# Patient Record
Sex: MALE | Race: WHITE | NOT HISPANIC OR LATINO | Employment: OTHER | URBAN - METROPOLITAN AREA
[De-identification: names, ages, dates, MRNs, and addresses within clinical notes are randomized per-mention and may not be internally consistent; named-entity substitution may affect disease eponyms.]

---

## 2021-03-16 ENCOUNTER — TRANSCRIBE ORDERS (OUTPATIENT)
Dept: LAB | Facility: HOSPITAL | Age: 66
End: 2021-03-16

## 2021-03-16 ENCOUNTER — TRANSCRIBE ORDERS (OUTPATIENT)
Dept: CARDIOLOGY | Facility: HOSPITAL | Age: 66
End: 2021-03-16

## 2021-03-16 ENCOUNTER — HOSPITAL ENCOUNTER (OUTPATIENT)
Dept: CARDIOLOGY | Facility: HOSPITAL | Age: 66
Discharge: HOME | End: 2021-03-16

## 2021-03-16 ENCOUNTER — LAB (OUTPATIENT)
Dept: LAB | Facility: HOSPITAL | Age: 66
End: 2021-03-16

## 2021-03-16 DIAGNOSIS — Z01.812 PRE-OPERATIVE LABORATORY EXAMINATION: Primary | ICD-10-CM

## 2021-03-16 DIAGNOSIS — Z01.812 PRE-OPERATIVE LABORATORY EXAMINATION: ICD-10-CM

## 2021-03-16 LAB
ALBUMIN SERPL-MCNC: 4.2 G/DL (ref 3.5–5.2)
ALBUMIN/GLOB SERPL: 1.6 G/DL
ALP SERPL-CCNC: 44 U/L (ref 39–117)
ALT SERPL W P-5'-P-CCNC: 45 U/L (ref 1–41)
ANION GAP SERPL CALCULATED.3IONS-SCNC: 8.4 MMOL/L (ref 5–15)
AST SERPL-CCNC: 31 U/L (ref 1–40)
BILIRUB SERPL-MCNC: 0.7 MG/DL (ref 0–1.2)
BUN SERPL-MCNC: 15 MG/DL (ref 8–23)
BUN/CREAT SERPL: 13.5 (ref 7–25)
CALCIUM SPEC-SCNC: 8.9 MG/DL (ref 8.6–10.5)
CHLORIDE SERPL-SCNC: 104 MMOL/L (ref 98–107)
CO2 SERPL-SCNC: 26.6 MMOL/L (ref 22–29)
CREAT SERPL-MCNC: 1.11 MG/DL (ref 0.76–1.27)
DEPRECATED RDW RBC AUTO: 48.2 FL (ref 37–54)
ERYTHROCYTE [DISTWIDTH] IN BLOOD BY AUTOMATED COUNT: 15.7 % (ref 12.3–15.4)
GFR SERPL CREATININE-BSD FRML MDRD: 66 ML/MIN/1.73
GLOBULIN UR ELPH-MCNC: 2.6 GM/DL
GLUCOSE SERPL-MCNC: 112 MG/DL (ref 65–99)
HCT VFR BLD AUTO: 40.2 % (ref 37.5–51)
HGB BLD-MCNC: 13.2 G/DL (ref 13–17.7)
MCH RBC QN AUTO: 27.7 PG (ref 26.6–33)
MCHC RBC AUTO-ENTMCNC: 32.8 G/DL (ref 31.5–35.7)
MCV RBC AUTO: 84.5 FL (ref 79–97)
PLATELET # BLD AUTO: 247 10*3/MM3 (ref 140–450)
PMV BLD AUTO: 10.9 FL (ref 6–12)
POTASSIUM SERPL-SCNC: 4.2 MMOL/L (ref 3.5–5.2)
PROT SERPL-MCNC: 6.8 G/DL (ref 6–8.5)
QT INTERVAL: 408 MS
QTC INTERVAL: 433 MS
RBC # BLD AUTO: 4.76 10*6/MM3 (ref 4.14–5.8)
SODIUM SERPL-SCNC: 139 MMOL/L (ref 136–145)
WBC # BLD AUTO: 5.16 10*3/MM3 (ref 3.4–10.8)

## 2021-03-16 PROCEDURE — 36415 COLL VENOUS BLD VENIPUNCTURE: CPT

## 2021-03-16 PROCEDURE — 85027 COMPLETE CBC AUTOMATED: CPT

## 2021-03-16 PROCEDURE — 93005 ELECTROCARDIOGRAM TRACING: CPT | Performed by: OTOLARYNGOLOGY

## 2021-03-16 PROCEDURE — 93010 ELECTROCARDIOGRAM REPORT: CPT | Performed by: INTERNAL MEDICINE

## 2021-03-16 PROCEDURE — 80053 COMPREHEN METABOLIC PANEL: CPT

## 2021-08-09 ENCOUNTER — OFFICE VISIT (OUTPATIENT)
Dept: FAMILY MEDICINE CLINIC | Facility: CLINIC | Age: 66
End: 2021-08-09

## 2021-08-09 VITALS
HEIGHT: 73 IN | TEMPERATURE: 98.7 F | OXYGEN SATURATION: 97 % | WEIGHT: 262.6 LBS | HEART RATE: 70 BPM | DIASTOLIC BLOOD PRESSURE: 80 MMHG | SYSTOLIC BLOOD PRESSURE: 115 MMHG | BODY MASS INDEX: 34.8 KG/M2

## 2021-08-09 DIAGNOSIS — Z12.11 SCREENING FOR COLORECTAL CANCER: ICD-10-CM

## 2021-08-09 DIAGNOSIS — N52.9 ERECTILE DYSFUNCTION, UNSPECIFIED ERECTILE DYSFUNCTION TYPE: Primary | ICD-10-CM

## 2021-08-09 DIAGNOSIS — Z12.12 SCREENING FOR COLORECTAL CANCER: ICD-10-CM

## 2021-08-09 DIAGNOSIS — N40.1 BENIGN PROSTATIC HYPERPLASIA WITH NOCTURIA: ICD-10-CM

## 2021-08-09 DIAGNOSIS — R35.1 BENIGN PROSTATIC HYPERPLASIA WITH NOCTURIA: ICD-10-CM

## 2021-08-09 DIAGNOSIS — Z00.00 ANNUAL PHYSICAL EXAM: ICD-10-CM

## 2021-08-09 DIAGNOSIS — Z12.5 PROSTATE CANCER SCREENING: ICD-10-CM

## 2021-08-09 LAB — HBA1C MFR BLD: 5.5 % (ref 4.8–5.6)

## 2021-08-09 PROCEDURE — 90471 IMMUNIZATION ADMIN: CPT | Performed by: FAMILY MEDICINE

## 2021-08-09 PROCEDURE — 90715 TDAP VACCINE 7 YRS/> IM: CPT | Performed by: FAMILY MEDICINE

## 2021-08-09 PROCEDURE — 84153 ASSAY OF PSA TOTAL: CPT | Performed by: FAMILY MEDICINE

## 2021-08-09 PROCEDURE — 90750 HZV VACC RECOMBINANT IM: CPT | Performed by: FAMILY MEDICINE

## 2021-08-09 PROCEDURE — 99387 INIT PM E/M NEW PAT 65+ YRS: CPT | Performed by: FAMILY MEDICINE

## 2021-08-09 PROCEDURE — 83036 HEMOGLOBIN GLYCOSYLATED A1C: CPT | Performed by: FAMILY MEDICINE

## 2021-08-09 PROCEDURE — 99204 OFFICE O/P NEW MOD 45 MIN: CPT | Performed by: FAMILY MEDICINE

## 2021-08-09 PROCEDURE — 80061 LIPID PANEL: CPT | Performed by: FAMILY MEDICINE

## 2021-08-09 PROCEDURE — 86803 HEPATITIS C AB TEST: CPT | Performed by: FAMILY MEDICINE

## 2021-08-09 PROCEDURE — 80053 COMPREHEN METABOLIC PANEL: CPT | Performed by: FAMILY MEDICINE

## 2021-08-09 PROCEDURE — 81001 URINALYSIS AUTO W/SCOPE: CPT | Performed by: FAMILY MEDICINE

## 2021-08-09 PROCEDURE — 90472 IMMUNIZATION ADMIN EACH ADD: CPT | Performed by: FAMILY MEDICINE

## 2021-08-09 PROCEDURE — G0432 EIA HIV-1/HIV-2 SCREEN: HCPCS | Performed by: FAMILY MEDICINE

## 2021-08-09 RX ORDER — FLUTICASONE PROPIONATE 50 MCG
2 SPRAY, SUSPENSION (ML) NASAL DAILY
COMMUNITY

## 2021-08-09 RX ORDER — TADALAFIL 5 MG/1
5 TABLET ORAL DAILY PRN
Qty: 30 TABLET | Refills: 0 | Status: SHIPPED | OUTPATIENT
Start: 2021-08-09 | End: 2021-11-09

## 2021-08-09 RX ORDER — DIPHENHYDRAMINE HCL 25 MG
25 CAPSULE ORAL EVERY 6 HOURS PRN
COMMUNITY
End: 2023-03-14

## 2021-08-09 RX ORDER — TAMSULOSIN HYDROCHLORIDE 0.4 MG/1
1 CAPSULE ORAL DAILY
Qty: 90 CAPSULE | Refills: 0 | Status: SHIPPED | OUTPATIENT
Start: 2021-08-09 | End: 2021-11-09 | Stop reason: SDUPTHER

## 2021-08-09 RX ORDER — CETIRIZINE HCL 10 MG/1
10 CAPSULE ORAL DAILY
COMMUNITY

## 2021-08-09 RX ORDER — MULTIPLE VITAMINS W/ MINERALS TAB 9MG-400MCG
1 TAB ORAL DAILY
COMMUNITY

## 2021-08-09 NOTE — PROGRESS NOTES
New Patient Office Visit      Patient Name: Angel Avelar  : 1955   MRN: [id number]     Chief Complaint:    Chief Complaint   Patient presents with   • Establish Care     annual       History of Present Illness: Angel Avelar is a 66 y.o. male who is here today to establish care. Patient presents for annual exam. Patient denies regular exercise. Patient eats well balanced diet but does eat some sweets.     BPH - patient states he has an enlarged prostate. Patient states he seen a urologist when he lived in [place]. Patient states he did not get much relief while seeing urologist and stopped. Patient states he has hesitancy, weak stream, dribbling and nocturia. Patient states he gets up at least 1x/nightly. Patient states he limits fluids after 8pm and tends to not get up more than once nightly. Patient denies ever trying tamsulosin for this problem. Patient has 2 brothers and father that had prostate cancer.    Erectile dysfunction - patient states this is caused from the BPH. Patient tried cialis 20 mg and states it did help some, patient states he felt this did not help as much after taking it for a while.  Prescription ran out in march and would like to have this filled today. Patient states he also tried a daily medication but it did not help as much as the cialis.    Hearing problems - patient had part of bone removed in right ear 3/2021. Patient follows ENT, Dr WONG, at [facility].     Review of systems positive for history of BPH, erectile dysfunction and hearing problems. Patient denies any chest pain, shortness of breath, changes with bowel movements or urination.     Physical exam: mood and affect appropriate. Heart and lung exam normal. Neuro exam grossly intact except for hearing.    Subjective          Past Medical History:   Past Medical History:   Diagnosis Date   • Allergic        Past Surgical History:   Past Surgical History:   Procedure Laterality Date   • INNER EAR SURGERY         Family  History:   Family History   Problem Relation Age of Onset   • Diabetes Mother    • Cancer Father    • Prostate cancer Father    • Prostate cancer Brother    • No Known Problems Son    • No Known Problems Maternal Grandmother    • No Known Problems Maternal Grandfather    • No Known Problems Paternal Grandmother    • No Known Problems Paternal Grandfather    • Prostate cancer Brother    • Diabetes Brother    • No Known Problems Son    • No Known Problems Son        Social History:   Social History     Socioeconomic History   • Marital status:      Spouse name: Not on file   • Number of children: Not on file   • Years of education: Not on file   • Highest education level: Not on file   Tobacco Use   • Smoking status: Never Smoker   • Smokeless tobacco: Never Used   Vaping Use   • Vaping Use: Never used   Substance and Sexual Activity   • Alcohol use: Yes     Alcohol/week: 1.0 standard drinks     Types: 1 Glasses of wine per week     Comment: every now and then    • Drug use: Never   • Sexual activity: Yes     Partners: Female     Birth control/protection: None       Medications:     Current Outpatient Medications:   •  Cetirizine HCl (Allergy Relief) 10 MG capsule, Take  by mouth., Disp: , Rfl:   •  diphenhydrAMINE (BENADRYL) 25 mg capsule, Take 25 mg by mouth Every 6 (Six) Hours As Needed for Itching., Disp: , Rfl:   •  fluticasone (FLONASE) 50 MCG/ACT nasal spray, 2 sprays into the nostril(s) as directed by provider Daily., Disp: , Rfl:   •  multivitamin with minerals (EQ COMPLETE MULTIVIT ADULT 50+ PO), Take 1 tablet by mouth Daily., Disp: , Rfl:   •  tadalafil (Cialis) 5 MG tablet, Take 1 tablet by mouth Daily As Needed for Erectile Dysfunction., Disp: 30 tablet, Rfl: 0  •  tamsulosin (FLOMAX) 0.4 MG capsule 24 hr capsule, Take 1 capsule by mouth Daily., Disp: 90 capsule, Rfl: 0  No current facility-administered medications for this visit.    Allergies:   No Known Allergies    Objective     Physical Exam:  "Please see [facility] [place] for physical exam  Vital Signs:   Vitals:    08/09/21 0908   BP: 115/80   Pulse: 70   Temp: 98.7 °F (37.1 °C)   SpO2: 97%   Weight: 119 kg (262 lb 9.6 oz)   Height: 185.4 cm (73\")   PainSc: 0-No pain     Body mass index is 34.65 kg/m².       Assessment / Plan      Assessment/Plan:   Diagnoses and all orders for this visit:    1. Erectile dysfunction, unspecified erectile dysfunction type (Primary)  -     tadalafil (Cialis) 5 MG tablet; Take 1 tablet by mouth Daily As Needed for Erectile Dysfunction.  Dispense: 30 tablet; Refill: 0    2. Benign prostatic hyperplasia with nocturia  -     tamsulosin (FLOMAX) 0.4 MG capsule 24 hr capsule; Take 1 capsule by mouth Daily.  Dispense: 90 capsule; Refill: 0    3. Screening for colorectal cancer    4. Prostate cancer screening  -     PSA DIAGNOSTIC ONLY    5. Annual physical exam  -     Hemoglobin A1c  -     Comprehensive Metabolic Panel  -     Urinalysis With Culture If Indicated - Urine, Clean Catch  -     Lipid Panel  -     Hepatitis C Antibody  -     HIV-1 / O / 2 Ag / Antibody 4th Generation  -     Tdap Vaccine Greater Than or Equal To 8yo IM  -     Shingrix Vaccine  -     pneumococcal polysaccharide 23-valent (PNEUMOVAX-23) vaccine 0.5 mL        1. Annual counseling: Counseled patient regards to the vaccine status and he agreed to tetanus, zoster and shingles vaccines.  Counseled patient regards to screening labs above hip reveals as well.  We discussed prostate cancer screening colorectal cancer screening.  Patient agreed to prostate cancer screening today.    The patient's BPH we will try Flomax.  In the future he may have to consult urology for further evaluation.  He reports abnormal shaped prostate in the past which is concerning for prostate cancer.      Follow Up:   Return in about 3 months (around 11/9/2021).    True Valladares,   [facility] [place]     I attest that I have reviewed the student note and that the components of the history " of the present illness, the physical exam, and the assessment and plan documented were performed by me or were performed in my presence by the student and verified by me.

## 2021-08-10 DIAGNOSIS — N18.31 STAGE 3A CHRONIC KIDNEY DISEASE (HCC): Primary | ICD-10-CM

## 2021-08-10 DIAGNOSIS — R97.20 ELEVATED PSA: ICD-10-CM

## 2021-08-10 LAB
ALBUMIN SERPL-MCNC: 4.5 G/DL (ref 3.5–5.2)
ALBUMIN/GLOB SERPL: 1.7 G/DL
ALP SERPL-CCNC: 43 U/L (ref 39–117)
ALT SERPL W P-5'-P-CCNC: 35 U/L (ref 1–41)
ANION GAP SERPL CALCULATED.3IONS-SCNC: 12.3 MMOL/L (ref 5–15)
AST SERPL-CCNC: 28 U/L (ref 1–40)
BACTERIA UR QL AUTO: ABNORMAL /HPF
BILIRUB SERPL-MCNC: 1.1 MG/DL (ref 0–1.2)
BILIRUB UR QL STRIP: NEGATIVE
BUN SERPL-MCNC: 17 MG/DL (ref 8–23)
BUN/CREAT SERPL: 11.1 (ref 7–25)
CALCIUM SPEC-SCNC: 9.5 MG/DL (ref 8.6–10.5)
CHLORIDE SERPL-SCNC: 108 MMOL/L (ref 98–107)
CHOLEST SERPL-MCNC: 158 MG/DL (ref 0–200)
CLARITY UR: CLEAR
CO2 SERPL-SCNC: 23.7 MMOL/L (ref 22–29)
COD CRY URNS QL: ABNORMAL /HPF
COLOR UR: ABNORMAL
CREAT SERPL-MCNC: 1.53 MG/DL (ref 0.76–1.27)
GFR SERPL CREATININE-BSD FRML MDRD: 46 ML/MIN/1.73
GLOBULIN UR ELPH-MCNC: 2.6 GM/DL
GLUCOSE SERPL-MCNC: 94 MG/DL (ref 65–99)
GLUCOSE UR STRIP-MCNC: NEGATIVE MG/DL
HCV AB SER DONR QL: NORMAL
HDLC SERPL-MCNC: 41 MG/DL (ref 40–60)
HGB UR QL STRIP.AUTO: NEGATIVE
HIV1+2 AB SER QL: NORMAL
HYALINE CASTS UR QL AUTO: ABNORMAL /LPF
KETONES UR QL STRIP: ABNORMAL
LDLC SERPL CALC-MCNC: 100 MG/DL (ref 0–100)
LDLC/HDLC SERPL: 2.42 {RATIO}
LEUKOCYTE ESTERASE UR QL STRIP.AUTO: NEGATIVE
MUCOUS THREADS URNS QL MICRO: ABNORMAL /HPF
NITRITE UR QL STRIP: NEGATIVE
PH UR STRIP.AUTO: 5.5 [PH] (ref 5–8)
POTASSIUM SERPL-SCNC: 4.4 MMOL/L (ref 3.5–5.2)
PROT SERPL-MCNC: 7.1 G/DL (ref 6–8.5)
PROT UR QL STRIP: ABNORMAL
PSA SERPL-MCNC: 4.62 NG/ML (ref 0–4)
RBC # UR: ABNORMAL /HPF
REF LAB TEST METHOD: ABNORMAL
SODIUM SERPL-SCNC: 144 MMOL/L (ref 136–145)
SP GR UR STRIP: 1.02 (ref 1–1.03)
SPERM URNS QL MICRO: ABNORMAL /HPF
SQUAMOUS #/AREA URNS HPF: ABNORMAL /HPF
TRIGL SERPL-MCNC: 88 MG/DL (ref 0–150)
UROBILINOGEN UR QL STRIP: ABNORMAL
VLDLC SERPL-MCNC: 17 MG/DL (ref 5–40)
WBC UR QL AUTO: ABNORMAL /HPF

## 2021-08-17 ENCOUNTER — OFFICE VISIT (OUTPATIENT)
Dept: UROLOGY | Facility: CLINIC | Age: 66
End: 2021-08-17

## 2021-08-17 VITALS
HEIGHT: 73 IN | SYSTOLIC BLOOD PRESSURE: 126 MMHG | DIASTOLIC BLOOD PRESSURE: 74 MMHG | OXYGEN SATURATION: 97 % | WEIGHT: 265 LBS | HEART RATE: 91 BPM | BODY MASS INDEX: 35.12 KG/M2

## 2021-08-17 DIAGNOSIS — R39.9 LOWER URINARY TRACT SYMPTOMS (LUTS): ICD-10-CM

## 2021-08-17 DIAGNOSIS — N52.9 ERECTILE DYSFUNCTION, UNSPECIFIED ERECTILE DYSFUNCTION TYPE: ICD-10-CM

## 2021-08-17 DIAGNOSIS — R97.20 ELEVATED PROSTATE SPECIFIC ANTIGEN (PSA): Primary | ICD-10-CM

## 2021-08-17 LAB
BILIRUB BLD-MCNC: NEGATIVE MG/DL
CLARITY, POC: CLEAR
COLOR UR: YELLOW
GLUCOSE UR STRIP-MCNC: NEGATIVE MG/DL
KETONES UR QL: NEGATIVE
LEUKOCYTE EST, POC: NEGATIVE
NITRITE UR-MCNC: NEGATIVE MG/ML
PH UR: 6 [PH] (ref 5–8)
PROT UR STRIP-MCNC: NEGATIVE MG/DL
RBC # UR STRIP: NEGATIVE /UL
SP GR UR: 1.01 (ref 1–1.03)
UROBILINOGEN UR QL: NORMAL

## 2021-08-17 PROCEDURE — 51798 US URINE CAPACITY MEASURE: CPT | Performed by: UROLOGY

## 2021-08-17 PROCEDURE — 99205 OFFICE O/P NEW HI 60 MIN: CPT | Performed by: UROLOGY

## 2021-08-17 PROCEDURE — 81003 URINALYSIS AUTO W/O SCOPE: CPT | Performed by: UROLOGY

## 2021-08-17 NOTE — PROGRESS NOTES
Elevated PSA Office Visit      Patient Name: Angel Avelar  : 1955   MRN: [id number]     Chief Complaint:  Elevated PSA.    Chief Complaint   Patient presents with   • Elevated PSA       Referring Provider: True Valladares DO    History of Present Illness: Angel Avelar is a 66 y.o. male who presents today with history of elevated PSA, lower urinary tract symptoms, ED.  Patient reports a multiple year history of lower urinary tract symptoms.  He reports that he was previously followed by urologist in [place].  He has recently moved an established residence in [place].  He reports that his symptoms include mild difficulty initiating stream, weakened urinary stream and subjective feeling of incomplete emptying.  Patient reports nocturia 1X.  He was recently seen by newly established PCP and was started on tamsulosin 0.4 mg daily.  He reports improvement in symptoms with starting p.o. medication.  Also reports multiple year history of difficulty with erections  He reports he is able to achieve erection but has difficulty in maintaining an erection through intercourse.  He has previously trialed both brand-name Cialis and generic tadalafil.  He reports this medication has helped with his symptoms but his prescription has recently been discontinued when he moved to [place].  He was recently seen by newly established PCP and was started on 5 mg tadalafil as needed.  At his annual exam a PSA was obtained and this was demonstrated to be 4.6.  The patient was referred to urology for evaluation of this PSA value.  Patient denies prior history of urinary tract infection.  Denies history of hematuria.  Denies prior urologic procedure or instrumentation.  Patient is a never smoker.  Does have a history of prostate cancer in both his father and brother.    Subjective      Review of System: Review of Systems   Constitutional: Negative.  Negative for chills, fatigue, fever and unexpected weight change.   HENT:  Negative.  Negative for sore throat.    Eyes: Negative.  Negative for visual disturbance.   Respiratory: Negative.  Negative for cough, chest tightness and shortness of breath.    Cardiovascular: Negative.  Negative for chest pain and leg swelling.   Gastrointestinal: Negative.  Negative for blood in stool, constipation, diarrhea, nausea, rectal pain and vomiting.   Genitourinary: Positive for difficulty urinating, frequency and urgency. Negative for decreased urine volume, dysuria, enuresis, flank pain, genital sores and hematuria.   Musculoskeletal: Negative.  Negative for back pain and joint swelling.   Skin: Negative.  Negative for rash and wound.   Neurological: Negative.  Negative for seizures, speech difficulty, weakness and headaches.   Psychiatric/Behavioral: Negative.  Negative for confusion, sleep disturbance and suicidal ideas. The patient is not nervous/anxious.       I have reviewed the ROS documented by my clinical staff, updated as appropriate and I agree. Jaime Harley MD    Past Medical History:   Past Medical History:   Diagnosis Date   • Allergic        Past Surgical History:   Past Surgical History:   Procedure Laterality Date   • INNER EAR SURGERY         Family History:   Family History   Problem Relation Age of Onset   • Diabetes Mother    • Cancer Father    • Prostate cancer Father    • Prostate cancer Brother    • No Known Problems Son    • No Known Problems Maternal Grandmother    • No Known Problems Maternal Grandfather    • No Known Problems Paternal Grandmother    • No Known Problems Paternal Grandfather    • Prostate cancer Brother    • Diabetes Brother    • No Known Problems Son    • No Known Problems Son        Social History:   Social History     Socioeconomic History   • Marital status:      Spouse name: Not on file   • Number of children: Not on file   • Years of education: Not on file   • Highest education level: Not on file   Tobacco Use   • Smoking status: Never Smoker    • Smokeless tobacco: Never Used   Vaping Use   • Vaping Use: Never used   Substance and Sexual Activity   • Alcohol use: Yes     Alcohol/week: 1.0 standard drinks     Types: 1 Glasses of wine per week     Comment: every now and then    • Drug use: Never   • Sexual activity: Yes     Partners: Female     Birth control/protection: None       Medications:     Current Outpatient Medications:   •  Cetirizine HCl (Allergy Relief) 10 MG capsule, Take  by mouth., Disp: , Rfl:   •  diphenhydrAMINE (BENADRYL) 25 mg capsule, Take 25 mg by mouth Every 6 (Six) Hours As Needed for Itching., Disp: , Rfl:   •  fluticasone (FLONASE) 50 MCG/ACT nasal spray, 2 sprays into the nostril(s) as directed by provider Daily., Disp: , Rfl:   •  multivitamin with minerals (EQ COMPLETE MULTIVIT ADULT 50+ PO), Take 1 tablet by mouth Daily., Disp: , Rfl:   •  tadalafil (Cialis) 5 MG tablet, Take 1 tablet by mouth Daily As Needed for Erectile Dysfunction., Disp: 30 tablet, Rfl: 0  •  tamsulosin (FLOMAX) 0.4 MG capsule 24 hr capsule, Take 1 capsule by mouth Daily., Disp: 90 capsule, Rfl: 0    Allergies:   No Known Allergies    IPSS Questionnaire (AUA-7):  Over the past month…    1)  Incomplete Emptying  How often have you had a sensation of not emptying your bladder?  3 - About half the time   2)  Frequency  How often have you had to urinate less than every two hours? 3 - About half the time   3)  Intermittency  How often have you found you stopped and started again several times when you urinated?  0 - Not at all   4) Urgency  How often have you found it difficult to postpone urination?  4 - More than half the time   5) Weak Stream  How often have you had a weak urinary stream?  5 - Almost always   6) Straining  How often have you had to push or strain to begin urination?  1 - Less than 1 time in 5   7) Nocturia  How many times did you typically get up at night to urinate?  1 - 1 time   Total Score:  17       Quality of life due to urinary  "symptoms:  If you were to spend the rest of your life with your urinary condition the way it is now, how would you feel about that? 3-Mixed   Urine Leakage (Incontinence) 1-Mild (A few drops a day, no pad use)     Sexual Health Inventory for Men (JENNIFER)   Over the past 6 months:     1. How do you rate your confidence that you could get and keep an erection?  2 - Low   2. When you had erections with sexual                                     stimulation, how often were your erections hard enough for penetration (entering your partner)?  2 - A few times (much less than half the time)   3. During sexual intercourse, how often were you able to maintain your erection after you had penetrated (entered) your partner?  1 - Almost never or never   4. During sexual intercourse, how difficult was it to maintain your erection to completion of intercourse?  2 - Very difficult    5. When you attempted sexual intercourse, how often was it satisfactory for you?  2 - A few times (much less than half the time)    Total Score: 9   The Sexual Health Inventory for Men further classifies ED severity with the following breakpoints:   1-7 (Severe ED) 8-11 (Moderate ED) 12-16 (Mild to Moderate ED) 17-21 (Mild ED)      Post void residual bladder scan:   00mL     Objective     Physical Exam:   Vital Signs:   Vitals:    08/17/21 1351   BP: 126/74   BP Location: Right arm   Patient Position: Sitting   Cuff Size: Adult   Pulse: 91   SpO2: 97%   Weight: 120 kg (265 lb)   Height: 185.4 cm (73\")   PainSc: 0-No pain     Body mass index is 34.96 kg/m².     Physical Exam  Vitals and nursing note reviewed.   Constitutional:       Appearance: Normal appearance. He is normal weight.   HENT:      Head: Normocephalic and atraumatic.   Eyes:      Extraocular Movements: Extraocular movements intact.   Cardiovascular:      Comments: Well-perfused  Pulmonary:      Effort: Pulmonary effort is normal.   Abdominal:      General: Abdomen is flat.      Palpations: " Abdomen is soft.   Musculoskeletal:         General: Normal range of motion.   Skin:     General: Skin is warm and dry.   Neurological:      General: No focal deficit present.      Mental Status: He is alert and oriented to person, place, and time.   Psychiatric:         Mood and Affect: Mood normal.         Behavior: Behavior normal.         Thought Content: Thought content normal.         Judgment: Judgment normal.         Genitourinary  Penis: circumcised penis, orthotopic meatus, glans normal, no penile discharge.  No rashes/lesions.    Testes: descended bilaterally, no masses, nontender to palpation. Remainder of scrotal contents normal. No hernia appreciated.  Rectal:  Normal tone, no masses.  Prostate: Greater than 40 grams.  Symmetric, non-tender, anodular and no induration.      Labs:   Hematocrit (%)   Date Value   03/16/2021 40.2       Lab Results   Component Value Date    PSA 4.620 (H) 08/09/2021     Have personally reviewed the patient's most recent PSA value of 4.6 in 8/2020.    I personally reviewed the patient's UA from 8/17/2020 which is negative for glucose bilirubin ketones blood protein nitrate LE.    Images:   No Images in the past 120 days found.    Measures:   Tobacco:   Angel Avelar  reports that he has never smoked. He has never used smokeless tobacco.. I have educated him on the risk of diseases from using tobacco products.     Assessment / Plan      Assessment:     Mr. Avelar is a 66 y.o. male with longstanding history of lower urinary tract symptoms and erectile dysfunction.  Also recently had a PSA obtained during an annual screening and PSA resulted at 4.6.        1. Elevated prostate specific antigen (PSA) (Primary)  -     POC Urinalysis Dipstick, Automated  -     PSA DIAGNOSTIC; Future      Today we discussed the etiology and management of elevated PSA.  Discussed that PSA is a protein used as a marker for prostate cancer screening. We discussed in depth the role for prostate cancer  screening.   We discussed that over 90% of prostate cancers are detected by screening.  We discussed that screening means a test performed on an asymptomatic patient to detect cancer at an earlier point in time.  We discussed the role of screening which includes both PSA and STEPHANIE.  We discussed the harms of prostate cancer screening including potential overdiagnosis and overtreatment.  Discussed the benefits of screening including diagnosis of cancer or lower staging grade.  Discussed that prostate imaging is not recommended for prostate cancer screening.  Discussed that screening should be offered to him in of an appropriate age to have a life expectancy more than 10 years.  Discussed that PSA is not capable of diagnosing prostate cancer.  We discussed that a biopsy is indicated if PSA is elevated as a confirmation of cancer.  Discussed the decision to perform a biopsy is often based on many criteria not just a total PSA value.  Discussed that a single abnormal PSA should not prompt biopsy.  Abnormal PSA level should be verified after a few weeks.  We discussed the possible etiologies that can result in an elevated PSA test other test other than cancer.   I evaluated his PSA which is 4.6. He does not have another comparison value.  I discussed that at this time I would like to repeat his PSA to ensure its exact value.  If his PSA remains elevated we will further discuss prostate biopsy.  Today we did discuss the procedural steps with regards to the risk and benefits of prostate biopsy.  Patient does have a significant family history of prostate cancer and therefore warrants continued follow-up.  I will plan to repeat his PSA in 6 months.    2. Lower urinary tract symptoms (LUTS)    Today we discussed with the patient the etiology and management of lower urinary tract symptoms.  Discussed the natural history of enlarged prostate.  Discussed his IPSS is 17 his quality of life is 3 mixed.  We discussed conservative  behavioral therapies for lower urinary tract symptoms.  Discussed  standard medical therapy including his newly started for blocker, tamsulosin 0.4 mg daily.  Because of side effects associated with this medicine.  We discussed that we will follow his urinary symptoms including his objective IPSS data.  Symptoms worsen we will continue to further evaluation with cystoscopy and transrectal ultrasound of the prostate for volume.  Consider further therapies including procedural interventions to improve lower urinary tract symptoms.  Patient currently does not have hematuria, recurrent infections, bladder stones.  At this time he does not desire further intervention but if his symptoms do not significant improve on medication the patient reports he would be interested in this.      3. Erectile dysfunction, unspecified erectile dysfunction type    We discussed the etiology and management of erectile dysfunction.  At this time the patient was recently restarted on tadalafil 5 mg as needed by his PCP..  We will evaluate his symptom improvement with p.o. medication with PDE 5 inhibitor.  We will continue to follow this at his next visit.          Follow Up:   Return in about 6 months (around 2/17/2022) for Follow up after Labs.    I spent greater than 60 minutes providing clinical care for this patient; including review of patient's chart and provider documentation, face to face time spent with patient in examination room (obtaining history, performing physical exam, discussing diagnosis and management options), placing orders, and completing patient documentation.     Jaime Harley MD  [facility] [place]

## 2021-10-26 ENCOUNTER — TELEPHONE (OUTPATIENT)
Dept: FAMILY MEDICINE CLINIC | Facility: CLINIC | Age: 66
End: 2021-10-26

## 2021-10-26 DIAGNOSIS — N52.9 ERECTILE DYSFUNCTION, UNSPECIFIED ERECTILE DYSFUNCTION TYPE: ICD-10-CM

## 2021-10-26 RX ORDER — TADALAFIL 5 MG/1
5 TABLET ORAL DAILY PRN
Qty: 30 TABLET | Refills: 0 | Status: CANCELLED | OUTPATIENT
Start: 2021-10-26

## 2021-11-09 ENCOUNTER — OFFICE VISIT (OUTPATIENT)
Dept: FAMILY MEDICINE CLINIC | Facility: CLINIC | Age: 66
End: 2021-11-09

## 2021-11-09 VITALS
BODY MASS INDEX: 34.62 KG/M2 | HEART RATE: 71 BPM | HEIGHT: 73 IN | DIASTOLIC BLOOD PRESSURE: 82 MMHG | RESPIRATION RATE: 16 BRPM | TEMPERATURE: 96.9 F | WEIGHT: 261.2 LBS | OXYGEN SATURATION: 98 % | SYSTOLIC BLOOD PRESSURE: 148 MMHG

## 2021-11-09 DIAGNOSIS — N40.1 BENIGN PROSTATIC HYPERPLASIA WITH NOCTURIA: ICD-10-CM

## 2021-11-09 DIAGNOSIS — N52.9 ERECTILE DYSFUNCTION, UNSPECIFIED ERECTILE DYSFUNCTION TYPE: ICD-10-CM

## 2021-11-09 DIAGNOSIS — Z12.12 SCREENING FOR COLORECTAL CANCER: Primary | ICD-10-CM

## 2021-11-09 DIAGNOSIS — R35.1 BENIGN PROSTATIC HYPERPLASIA WITH NOCTURIA: ICD-10-CM

## 2021-11-09 DIAGNOSIS — Z12.11 SCREENING FOR COLORECTAL CANCER: Primary | ICD-10-CM

## 2021-11-09 PROCEDURE — 99214 OFFICE O/P EST MOD 30 MIN: CPT | Performed by: FAMILY MEDICINE

## 2021-11-09 RX ORDER — TAMSULOSIN HYDROCHLORIDE 0.4 MG/1
1 CAPSULE ORAL DAILY
Qty: 90 CAPSULE | Refills: 3 | Status: SHIPPED | OUTPATIENT
Start: 2021-11-09 | End: 2022-05-04 | Stop reason: SDUPTHER

## 2021-11-09 RX ORDER — TADALAFIL 20 MG/1
20 TABLET ORAL DAILY PRN
Qty: 30 TABLET | Refills: 5 | Status: SHIPPED | OUTPATIENT
Start: 2021-11-09

## 2021-11-09 NOTE — PROGRESS NOTES
Follow Up Office Visit      Patient Name: Angel Avelar  : 1955   MRN: [id number]     Chief Complaint:    Chief Complaint   Patient presents with   • Prostate Problem     f/u        History of Present Illness: Angel Avelar is a 66 y.o. male who is here today to follow up with BPH, ED, and colonoscopy.  Patient did follow-up with urology and is back reporting that their visit went well.  Urologist was not too concerned about the PSA and we will recheck it.  They will consider biopsy if the PSA continues to elevate or if there is any worrisome signs.  Patient is here today mostly to talk about his erectile dysfunction.  We also discussed colorectal cancer screening.    BPH  -Flowmax is working well, less nocturia than before, better urinary symptoms during the day   -Met with Urologist and has plan set for PSA screening and when to biopsy     ED  -Wondering if can get more Cialis, it takes about 6 pills to get and maintain an erection     Colonoscopy  -Has been at least 5 years since last one, no pertinent findings per pt  -Is willing to schedule another one     Review of systems was positive for erectile dysfunction      Physical exam: Patient's mood and affect is appropriate.        Subjective        I have reviewed and the following portions of the patient's history were updated as appropriate: past family history, past medical history, past social history, past surgical history and problem list.    Medications:     Current Outpatient Medications:   •  Cetirizine HCl (Allergy Relief) 10 MG capsule, Take  by mouth., Disp: , Rfl:   •  diphenhydrAMINE (BENADRYL) 25 mg capsule, Take 25 mg by mouth Every 6 (Six) Hours As Needed for Itching., Disp: , Rfl:   •  fluticasone (FLONASE) 50 MCG/ACT nasal spray, 2 sprays into the nostril(s) as directed by provider Daily., Disp: , Rfl:   •  multivitamin with minerals (EQ COMPLETE MULTIVIT ADULT 50+ PO), Take 1 tablet by mouth Daily., Disp: , Rfl:   •  tamsulosin  "(FLOMAX) 0.4 MG capsule 24 hr capsule, Take 1 capsule by mouth Daily., Disp: 90 capsule, Rfl: 3  •  tadalafil (Cialis) 20 MG tablet, Take 1 tablet by mouth Daily As Needed for Erectile Dysfunction., Disp: 30 tablet, Rfl: 5    Allergies:   No Known Allergies    Objective     Physical Exam: Please see HPI for physical exam  Vital Signs:   Vitals:    11/09/21 1000   BP: 148/82   Pulse: 71   Resp: 16   Temp: 96.9 °F (36.1 °C)   TempSrc: Temporal   SpO2: 98%   Weight: 118 kg (261 lb 3.2 oz)   Height: 185.4 cm (73\")   PainSc: 0-No pain     Body mass index is 34.46 kg/m².          Assessment / Plan      Assessment/Plan:   Diagnoses and all orders for this visit:    1. Screening for colorectal cancer (Primary)  -     Ambulatory Referral For Screening Colonoscopy    2. Erectile dysfunction, unspecified erectile dysfunction type  -     tadalafil (Cialis) 20 MG tablet; Take 1 tablet by mouth Daily As Needed for Erectile Dysfunction.  Dispense: 30 tablet; Refill: 5    3. Benign prostatic hyperplasia with nocturia  -     tamsulosin (FLOMAX) 0.4 MG capsule 24 hr capsule; Take 1 capsule by mouth Daily.  Dispense: 90 capsule; Refill: 3         Follow Up:   Return in about 7 months (around 6/9/2022).    True Valladares DO  [facility] [place]     Answers for HPI/ROS submitted by the patient on 11/2/2021  What is the primary reason for your visit?: Other  Please describe your symptoms.: Checking on urination and Enlarge Prostate.  Have you had these symptoms before?: Yes  How long have you been having these symptoms?: Greater than 2 weeks  Please list any medications you are currently taking for this condition.: FlowMax.  Please describe any probable cause for these symptoms. : Enlarged Prostate    I attest that I have reviewed the student note and that the components of the history of the present illness, the physical exam, and the assessment and plan documented were performed by me or were performed in my presence by the " student and verified by me.

## 2021-11-17 DIAGNOSIS — Z12.11 SCREENING FOR COLON CANCER: Primary | ICD-10-CM

## 2021-11-17 RX ORDER — SODIUM, POTASSIUM,MAG SULFATES 17.5-3.13G
1 SOLUTION, RECONSTITUTED, ORAL ORAL TAKE AS DIRECTED
Qty: 354 ML | Refills: 0 | Status: SHIPPED | OUTPATIENT
Start: 2021-11-17 | End: 2022-09-06

## 2021-12-02 ENCOUNTER — OUTSIDE FACILITY SERVICE (OUTPATIENT)
Dept: GASTROENTEROLOGY | Facility: CLINIC | Age: 66
End: 2021-12-02

## 2021-12-02 PROCEDURE — 88305 TISSUE EXAM BY PATHOLOGIST: CPT | Performed by: INTERNAL MEDICINE

## 2021-12-02 PROCEDURE — 45385 COLONOSCOPY W/LESION REMOVAL: CPT | Performed by: INTERNAL MEDICINE

## 2021-12-03 ENCOUNTER — LAB REQUISITION (OUTPATIENT)
Dept: LAB | Facility: HOSPITAL | Age: 66
End: 2021-12-03

## 2021-12-03 DIAGNOSIS — K57.30 DIVERTICULOSIS OF LARGE INTESTINE WITHOUT PERFORATION OR ABSCESS WITHOUT BLEEDING: ICD-10-CM

## 2021-12-03 DIAGNOSIS — Z12.11 ENCOUNTER FOR SCREENING FOR MALIGNANT NEOPLASM OF COLON: ICD-10-CM

## 2021-12-03 DIAGNOSIS — K63.5 POLYP OF COLON: ICD-10-CM

## 2021-12-03 DIAGNOSIS — K64.8 OTHER HEMORRHOIDS: ICD-10-CM

## 2021-12-03 DIAGNOSIS — K62.1 RECTAL POLYP: ICD-10-CM

## 2021-12-06 LAB
CYTO UR: NORMAL
LAB AP CASE REPORT: NORMAL
LAB AP CLINICAL INFORMATION: NORMAL
PATH REPORT.FINAL DX SPEC: NORMAL
PATH REPORT.GROSS SPEC: NORMAL

## 2021-12-07 ENCOUNTER — TELEPHONE (OUTPATIENT)
Dept: GASTROENTEROLOGY | Facility: CLINIC | Age: 66
End: 2021-12-07

## 2021-12-07 NOTE — TELEPHONE ENCOUNTER
----- Message from Rolando Avery MD sent at 12/6/2021  4:35 PM EST -----  Let Mr. Avelar know there were 2 adenoma-type polyps.  He will need a repeat examination in 5 years.

## 2022-02-17 ENCOUNTER — OFFICE VISIT (OUTPATIENT)
Dept: UROLOGY | Facility: CLINIC | Age: 67
End: 2022-02-17

## 2022-02-17 VITALS — WEIGHT: 261 LBS | OXYGEN SATURATION: 99 % | HEART RATE: 77 BPM | BODY MASS INDEX: 34.59 KG/M2 | HEIGHT: 73 IN

## 2022-02-17 DIAGNOSIS — N52.9 ERECTILE DYSFUNCTION, UNSPECIFIED ERECTILE DYSFUNCTION TYPE: ICD-10-CM

## 2022-02-17 DIAGNOSIS — R97.20 ELEVATED PROSTATE SPECIFIC ANTIGEN (PSA): ICD-10-CM

## 2022-02-17 DIAGNOSIS — R39.9 LOWER URINARY TRACT SYMPTOMS (LUTS): Primary | ICD-10-CM

## 2022-02-17 LAB
BILIRUB BLD-MCNC: NEGATIVE MG/DL
CLARITY, POC: CLEAR
COLOR UR: YELLOW
EXPIRATION DATE: NORMAL
GLUCOSE UR STRIP-MCNC: NEGATIVE MG/DL
KETONES UR QL: NEGATIVE
LEUKOCYTE EST, POC: NEGATIVE
Lab: NORMAL
NITRITE UR-MCNC: NEGATIVE MG/ML
PH UR: 5.5 [PH] (ref 5–8)
PROT UR STRIP-MCNC: NEGATIVE MG/DL
RBC # UR STRIP: NEGATIVE /UL
SP GR UR: 1.02 (ref 1–1.03)
UROBILINOGEN UR QL: NORMAL

## 2022-02-17 PROCEDURE — 99214 OFFICE O/P EST MOD 30 MIN: CPT | Performed by: UROLOGY

## 2022-02-17 PROCEDURE — 81003 URINALYSIS AUTO W/O SCOPE: CPT | Performed by: UROLOGY

## 2022-02-17 NOTE — PROGRESS NOTES
Follow Up Office Visit      Patient Name: Angel Avelar  : 1955   MRN: [id number]     Chief Complaint:    Chief Complaint   Patient presents with   • Elevated PSA     History of Present Illness: Angel Avelar is a 66 y.o. male who presents today for follow up of elevated PSA, lower urinary tract symptoms, erectile dysfunction.  At last visit we discussed the patient's PSA value which was found to be 1.6.  We discussed follow-up in 6 months with a repeat PSA.  Unfortunately he did not have this lab value obtained.  At last visit we also evaluated and discussed lower urinary tract symptoms and erectile dysfunction.  He was initiated on p.o. medical management for each of these by primary care physician.  He reports improvement/stability in the symptoms.    Subjective      Review of System: Review of Systems   Constitutional: Negative for chills, fatigue, fever and unexpected weight change.   HENT: Negative for sore throat.    Eyes: Negative for visual disturbance.   Respiratory: Negative for cough, chest tightness and shortness of breath.    Cardiovascular: Negative for chest pain and leg swelling.   Gastrointestinal: Negative for blood in stool, constipation, diarrhea, nausea, rectal pain and vomiting.   Genitourinary: Negative for decreased urine volume, difficulty urinating, dysuria, enuresis, flank pain, frequency, genital sores, hematuria and urgency.   Musculoskeletal: Negative for back pain and joint swelling.   Skin: Negative for rash and wound.   Neurological: Negative for seizures, speech difficulty, weakness and headaches.   Psychiatric/Behavioral: Negative for confusion, sleep disturbance and suicidal ideas. The patient is not nervous/anxious.       I have reviewed the ROS documented by my clinical staff, updated as appropriate and I agree. Jaime Harley MD    I have reviewed and the following portions of the patient's history were updated as appropriate: past family history, past medical  "history, past social history, past surgical history and problem list.    Medications:     Current Outpatient Medications:   •  Cetirizine HCl (Allergy Relief) 10 MG capsule, Take  by mouth., Disp: , Rfl:   •  diphenhydrAMINE (BENADRYL) 25 mg capsule, Take 25 mg by mouth Every 6 (Six) Hours As Needed for Itching., Disp: , Rfl:   •  fluticasone (FLONASE) 50 MCG/ACT nasal spray, 2 sprays into the nostril(s) as directed by provider Daily., Disp: , Rfl:   •  multivitamin with minerals (EQ COMPLETE MULTIVIT ADULT 50+ PO), Take 1 tablet by mouth Daily., Disp: , Rfl:   •  sodium-potassium-magnesium sulfates (Suprep Bowel Prep Kit) 17.5-3.13-1.6 GM/177ML solution oral solution, Take 1 bottle by mouth Take As Directed. Follow instructions that were mailed to your home. If you didn't receive these call [phone]., Disp: 354 mL, Rfl: 0  •  tadalafil (Cialis) 20 MG tablet, Take 1 tablet by mouth Daily As Needed for Erectile Dysfunction., Disp: 30 tablet, Rfl: 5  •  tamsulosin (FLOMAX) 0.4 MG capsule 24 hr capsule, Take 1 capsule by mouth Daily., Disp: 90 capsule, Rfl: 3    Allergies:   No Known Allergies      Post void residual bladder scan:   35mL    Objective     Physical Exam:   Vital Signs:   Vitals:    02/17/22 1127   Pulse: 77   SpO2: 99%   Weight: 118 kg (261 lb)   Height: 185.4 cm (72.99\")   PainSc: 0-No pain     Body mass index is 34.44 kg/m².     Physical Exam  Vitals and nursing note reviewed.   Constitutional:       Appearance: Normal appearance. He is normal weight.   Pulmonary:      Effort: Pulmonary effort is normal.   Abdominal:      General: Abdomen is flat.      Palpations: Abdomen is soft.   Musculoskeletal:         General: Normal range of motion.   Skin:     General: Skin is warm and dry.   Neurological:      General: No focal deficit present.      Mental Status: He is alert and oriented to person, place, and time. Mental status is at baseline.   Psychiatric:         Mood and Affect: Mood normal.        "  Behavior: Behavior normal.         Thought Content: Thought content normal.         Judgment: Judgment normal.             Labs:   Brief Urine Lab Results  (Last result in the past 365 days)      Color   Clarity   Blood   Leuk Est   Nitrite   Protein   CREAT   Urine HCG        02/17/22 1132 Yellow   Clear   Negative   Negative   Negative   Negative                      Lab Results   Component Value Date    GLUCOSE 94 08/09/2021    CALCIUM 9.5 08/09/2021     08/09/2021    K 4.4 08/09/2021    CO2 23.7 08/09/2021     (H) 08/09/2021    BUN 17 08/09/2021    CREATININE 1.53 (H) 08/09/2021    EGFRIFNONA 46 (L) 08/09/2021    BCR 11.1 08/09/2021    ANIONGAP 12.3 08/09/2021       Lab Results   Component Value Date    WBC 5.16 03/16/2021    HGB 13.2 03/16/2021    HCT 40.2 03/16/2021    MCV 84.5 03/16/2021     03/16/2021       Images:   No Images in the past 120 days found..    Measures:   Tobacco:   Angel Avelar  reports that he has never smoked. He has never used smokeless tobacco.. I have educated him on the risk of diseases from using tobacco products.    Assessment / Plan      Assessment/Plan:   66 y.o. male is seen today for follow up of lower urinary tract symptoms, elevated PSA, erectile dysfunction.  Patient was planned to follow-up today in 6 months for evaluation of symptoms as well as a repeat PSA.  Unfortunately he did not have lab value obtained.  He has been instructed to obtain PSA value.  He reports stability in his additional symptoms including erectile dysfunction lower urinary tract symptoms on p.o. medical management.  He will continue these.  We will evaluate PSA when he has this lab obtained.  He will follow-up in 6 months with a repeat PSA symptom check..     Diagnoses and all orders for this visit:    1. Lower urinary tract symptoms (LUTS) (Primary)  -     POC Urinalysis Dipstick, Automated         Follow Up:   Return in about 6 months (around 8/17/2022) for Recheck.     I spent  approximately 30 minutes providing clinical care for this patient; including review of patient's chart and provider documentation, face to face time spent with patient in examination room (obtaining history, performing physical exam, discussing diagnosis and management options), placing orders, and completing patient documentation.     Jaime Harley MD  [facility] [place]

## 2022-04-12 ENCOUNTER — LAB (OUTPATIENT)
Dept: LAB | Facility: HOSPITAL | Age: 67
End: 2022-04-12

## 2022-04-12 DIAGNOSIS — R97.20 ELEVATED PROSTATE SPECIFIC ANTIGEN (PSA): ICD-10-CM

## 2022-04-12 PROCEDURE — 36415 COLL VENOUS BLD VENIPUNCTURE: CPT

## 2022-04-12 PROCEDURE — 84153 ASSAY OF PSA TOTAL: CPT

## 2022-04-12 PROCEDURE — 84154 ASSAY OF PSA FREE: CPT

## 2022-04-13 LAB
PSA FREE MFR SERPL: 26.8 %
PSA FREE SERPL-MCNC: 1.34 NG/ML
PSA SERPL-MCNC: 5 NG/ML (ref 0–4)

## 2022-05-04 DIAGNOSIS — N40.1 BENIGN PROSTATIC HYPERPLASIA WITH NOCTURIA: ICD-10-CM

## 2022-05-04 DIAGNOSIS — R35.1 BENIGN PROSTATIC HYPERPLASIA WITH NOCTURIA: ICD-10-CM

## 2022-05-04 RX ORDER — TAMSULOSIN HYDROCHLORIDE 0.4 MG/1
1 CAPSULE ORAL DAILY
Qty: 90 CAPSULE | Refills: 3 | Status: SHIPPED | OUTPATIENT
Start: 2022-05-04

## 2022-05-04 NOTE — TELEPHONE ENCOUNTER
Caller: Angel Avelar    Relationship: Self    Best call back number:  [phone]    Requested Prescriptions:   Requested Prescriptions     Pending Prescriptions Disp Refills   • tamsulosin (FLOMAX) 0.4 MG capsule 24 hr capsule 90 capsule 3     Sig: Take 1 capsule by mouth Daily.        Pharmacy where request should be sent: [facility] - [address] - [phone]  - [phone] FX     Additional details provided by patient:  PATIENT HAS 5 CAPSULES LEFT OF MEDICATION   Does the patient have less than a 3 day supply:  [] Yes  [x] No

## 2022-08-10 ENCOUNTER — TELEPHONE (OUTPATIENT)
Dept: UROLOGY | Facility: CLINIC | Age: 67
End: 2022-08-10

## 2022-08-17 ENCOUNTER — LAB (OUTPATIENT)
Dept: LAB | Facility: HOSPITAL | Age: 67
End: 2022-08-17

## 2022-08-17 ENCOUNTER — OFFICE VISIT (OUTPATIENT)
Dept: FAMILY MEDICINE CLINIC | Facility: CLINIC | Age: 67
End: 2022-08-17

## 2022-08-17 VITALS
BODY MASS INDEX: 33.53 KG/M2 | OXYGEN SATURATION: 97 % | HEIGHT: 73 IN | HEART RATE: 69 BPM | SYSTOLIC BLOOD PRESSURE: 132 MMHG | WEIGHT: 253 LBS | DIASTOLIC BLOOD PRESSURE: 74 MMHG | TEMPERATURE: 97 F

## 2022-08-17 DIAGNOSIS — Z23 IMMUNIZATION DUE: ICD-10-CM

## 2022-08-17 DIAGNOSIS — N18.31 STAGE 3A CHRONIC KIDNEY DISEASE: ICD-10-CM

## 2022-08-17 DIAGNOSIS — R35.1 BENIGN PROSTATIC HYPERPLASIA WITH NOCTURIA: ICD-10-CM

## 2022-08-17 DIAGNOSIS — N52.9 ERECTILE DYSFUNCTION, UNSPECIFIED ERECTILE DYSFUNCTION TYPE: ICD-10-CM
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
## 2022-08-17 DIAGNOSIS — S70.361A TICK BITE OF RIGHT THIGH, INITIAL ENCOUNTER: ICD-10-CM

## 2022-08-17 DIAGNOSIS — N40.1 BENIGN PROSTATIC HYPERPLASIA WITH NOCTURIA: ICD-10-CM

## 2022-08-17 DIAGNOSIS — Z00.00 MEDICARE ANNUAL WELLNESS VISIT, INITIAL: Primary | ICD-10-CM

## 2022-08-17 DIAGNOSIS — L57.0 ACTINIC KERATOSIS: ICD-10-CM

## 2022-08-17 LAB
ALBUMIN SERPL-MCNC: 4.4 G/DL (ref 3.5–5.2)
ALBUMIN/GLOB SERPL: 1.8 G/DL
ALP SERPL-CCNC: 50 U/L (ref 39–117)
ALT SERPL W P-5'-P-CCNC: 25 U/L (ref 1–41)
ANION GAP SERPL CALCULATED.3IONS-SCNC: 7.5 MMOL/L (ref 5–15)
AST SERPL-CCNC: 18 U/L (ref 1–40)
BILIRUB SERPL-MCNC: 1.1 MG/DL (ref 0–1.2)
BUN SERPL-MCNC: 17 MG/DL (ref 8–23)
BUN/CREAT SERPL: 13.8 (ref 7–25)
CALCIUM SPEC-SCNC: 9.5 MG/DL (ref 8.6–10.5)
CHLORIDE SERPL-SCNC: 105 MMOL/L (ref 98–107)
CO2 SERPL-SCNC: 26.5 MMOL/L (ref 22–29)
CREAT SERPL-MCNC: 1.23 MG/DL (ref 0.76–1.27)
EGFRCR SERPLBLD CKD-EPI 2021: 64.3 ML/MIN/1.73
GLOBULIN UR ELPH-MCNC: 2.5 GM/DL
GLUCOSE SERPL-MCNC: 93 MG/DL (ref 65–99)
POTASSIUM SERPL-SCNC: 4.4 MMOL/L (ref 3.5–5.2)
PROT SERPL-MCNC: 6.9 G/DL (ref 6–8.5)
SODIUM SERPL-SCNC: 139 MMOL/L (ref 136–145)

## 2022-08-17 PROCEDURE — 1170F FXNL STATUS ASSESSED: CPT | Performed by: FAMILY MEDICINE

## 2022-08-17 PROCEDURE — 1126F AMNT PAIN NOTED NONE PRSNT: CPT | Performed by: FAMILY MEDICINE

## 2022-08-17 PROCEDURE — G0009 ADMIN PNEUMOCOCCAL VACCINE: HCPCS | Performed by: FAMILY MEDICINE

## 2022-08-17 PROCEDURE — 1159F MED LIST DOCD IN RCRD: CPT | Performed by: FAMILY MEDICINE

## 2022-08-17 PROCEDURE — G0438 PPPS, INITIAL VISIT: HCPCS | Performed by: FAMILY MEDICINE

## 2022-08-17 PROCEDURE — 17000 DESTRUCT PREMALG LESION: CPT | Performed by: FAMILY MEDICINE

## 2022-08-17 PROCEDURE — 80053 COMPREHEN METABOLIC PANEL: CPT

## 2022-08-17 PROCEDURE — 90677 PCV20 VACCINE IM: CPT | Performed by: FAMILY MEDICINE

## 2022-08-17 PROCEDURE — 99214 OFFICE O/P EST MOD 30 MIN: CPT | Performed by: FAMILY MEDICINE

## 2022-08-17 NOTE — PROGRESS NOTES
The ABCs of the Annual Wellness Visit  Welcome to Medicare Visit    Chief Complaint   Patient presents with   • Medicare Wellness-subsequent     Subjective {   History of Present Illness:  Angel Avelar is a 67 y.o. male who presents for a  Welcome to Medicare Visit.    Tick bite -happened recently and he thinks the tick was on there for over 1 week.  Does not feel sick and does not have any arthralgias.  No major rash.  Says that there is some redness to it.    bph -controlled on Flomax.    Ed -not controlled on Cialis.  He is reporting taking 2-3 Cialis as needed.  I discussed maximum dosage.  Patient's been on Viagra previously.    Skin lesion, AK -lesions started within the last month or 2.  Has been getting bigger.  Family history of skin cancer.      The following portions of the patient's history were reviewed and   updated as appropriate: allergies, current medications, past family history, past medical history, past social history, past surgical history and problem list.     Compared to one year ago, the patient feels his physical   health is better.    Compared to one year ago, the patient feels his mental   health is better.    Recent Hospitalizations:  He was not admitted to the hospital during the last year.       Current Medical Providers:  Patient Care Team:  True Valladares DO as PCP - General (Family Medicine)  Jaime Harley MD as Consulting Physician (Urology)  Rolando Avery MD as Consulting Physician (Gastroenterology)    Outpatient Medications Prior to Visit   Medication Sig Dispense Refill   • Cetirizine HCl (Allergy Relief) 10 MG capsule Take  by mouth.     • diphenhydrAMINE (BENADRYL) 25 mg capsule Take 25 mg by mouth Every 6 (Six) Hours As Needed for Itching.     • fluticasone (FLONASE) 50 MCG/ACT nasal spray 2 sprays into the nostril(s) as directed by provider Daily.     • multivitamin with minerals tablet tablet Take 1 tablet by mouth Daily.     • sodium-potassium-magnesium sulfates  "(Suprep Bowel Prep Kit) 17.5-3.13-1.6 GM/177ML solution oral solution Take 1 bottle by mouth Take As Directed. Follow instructions that were mailed to your home. If you didn't receive these call [phone]. 354 mL 0   • tadalafil (Cialis) 20 MG tablet Take 1 tablet by mouth Daily As Needed for Erectile Dysfunction. 30 tablet 5   • tamsulosin (FLOMAX) 0.4 MG capsule 24 hr capsule Take 1 capsule by mouth Daily. 90 capsule 3     No facility-administered medications prior to visit.       No opioid medication identified on active medication list. I have reviewed chart for other potential  high risk medication/s and harmful drug interactions in the elderly.          Aspirin is not on active medication list.  Aspirin use is not indicated based on review of current medical condition/s. Risk of harm outweighs potential benefits.  .    Patient Active Problem List   Diagnosis   • Erectile dysfunction   • Benign prostatic hyperplasia with nocturia   • Screening for colorectal cancer   • Lower urinary tract symptoms (LUTS)   • Elevated prostate specific antigen (PSA)     Advance Care Planning  Advance Directive is not on file.  ACP discussion was held with the patient during this visit. Patient has an advance directive (not in EMR), copy requested.    Review of Systems   Respiratory: Negative for chest tightness.    Cardiovascular: Negative for chest pain and leg swelling.   Musculoskeletal: Negative for joint swelling.   Skin: Positive for rash.        Objective      Vitals:    08/17/22 0745   BP: 132/74   Pulse: 69   Temp: 97 °F (36.1 °C)   SpO2: 97%   Weight: 115 kg (253 lb)   Height: 185.4 cm (73\")   PainSc: 0-No pain     Estimated body mass index is 33.38 kg/m² as calculated from the following:    Height as of this encounter: 185.4 cm (73\").    Weight as of this encounter: 115 kg (253 lb).    BMI is >= 30 and <35. (Class 1 Obesity). The following options were offered after discussion;: exercise " counseling/recommendations and nutrition counseling/recommendations      Does the patient have evidence of cognitive impairment? No    Physical Exam  Vitals reviewed.   HENT:      Head: Normocephalic.      Right Ear: Tympanic membrane normal.      Left Ear: Tympanic membrane normal.      Nose: Nose normal.      Mouth/Throat:      Mouth: Mucous membranes are moist.   Eyes:      Pupils: Pupils are equal, round, and reactive to light.   Cardiovascular:      Rate and Rhythm: Normal rate and regular rhythm.      Heart sounds: No murmur heard.  Pulmonary:      Effort: Pulmonary effort is normal. No respiratory distress.      Breath sounds: Normal breath sounds.   Abdominal:      General: Abdomen is flat.   Musculoskeletal:         General: No swelling.      Cervical back: Neck supple.   Skin:     Capillary Refill: Capillary refill takes less than 2 seconds.      Comments: 0.3 cm lesion on left forearm that is raised and papular.  Raised portion is rough and approximately 0.5 cm in height.  Color is yellow there is   Neurological:      General: No focal deficit present.      Mental Status: He is alert.   Psychiatric:         Mood and Affect: Mood normal.                     HEALTH RISK ASSESSMENT    Smoking Status:  Social History     Tobacco Use   Smoking Status Never Smoker   Smokeless Tobacco Never Used     Alcohol Consumption:  Social History     Substance and Sexual Activity   Alcohol Use Yes   • Alcohol/week: 1.0 standard drink   • Types: 1 Glasses of wine per week    Comment: every now and then        Fall Risk Screen:    STEADI Fall Risk Assessment was completed, and patient is at LOW risk for falls.Assessment completed on:8/17/2022    Depression Screen:   PHQ-2/PHQ-9 Depression Screening 8/17/2022   Retired PHQ-9 Total Score -   Retired Total Score -   Little Interest or Pleasure in Doing Things 0-->not at all   Feeling Down, Depressed or Hopeless 0-->not at all   PHQ-9: Brief Depression Severity Measure Score 0        Health Habits and Functional and Cognitive Screening:  Functional & Cognitive Status 8/17/2022   Do you have difficulty preparing food and eating? No   Do you have difficulty bathing yourself, getting dressed or grooming yourself? No   Do you have difficulty using the toilet? No   Do you have difficulty moving around from place to place? No   Do you have trouble with steps or getting out of a bed or a chair? No   Current Diet Well Balanced Diet   Dental Exam Not up to date   Eye Exam Up to date   Exercise (times per week) 4 times per week   Current Exercises Include Yard Work   Do you need help using the phone?  No   Are you deaf or do you have serious difficulty hearing?  No   Do you need help with transportation? No   Do you need help shopping? No   Do you need help preparing meals?  No   Do you need help with housework?  No   Do you need help with laundry? No   Do you need help taking your medications? No   Do you need help managing money? No   Do you ever drive or ride in a car without wearing a seat belt? No   Have you felt unusual stress, anger or loneliness in the last month? No   Who do you live with? Spouse   If you need help, do you have trouble finding someone available to you? No   Have you been bothered in the last four weeks by sexual problems? No   Do you have difficulty concentrating, remembering or making decisions? No       Visual Acuity:    No exam data present    Age-appropriate Screening Schedule:  Refer to the list below for future screening recommendations based on patient's age, sex and/or medical conditions. Orders for these recommended tests are listed in the plan section. The patient has been provided with a written plan.    Health Maintenance   Topic Date Due   • ZOSTER VACCINE (2 of 2) 10/04/2021   • INFLUENZA VACCINE  10/01/2022   • TDAP/TD VACCINES (2 - Td or Tdap) 08/09/2031          Assessment & Plan   CMS Preventative Services Quick Reference  Risk Factors Identified During  Encounter  Immunizations Discussed/Encouraged (specific Immunizations; Prevnar 20 (Pneumococcal 20-valent conjugate)  Inadequate Social Support, Isolation, Loneliness, Lack of Transportation, Financial Difficulties, or Caregiver Stress   Inactivity/Sedentary  The above risks/problems have been discussed with the patient.  Pertinent information has been shared with the patient in the After Visit Summary.  Follow up plans and orders are seen below in the Assessment/Plan Section.    Diagnoses and all orders for this visit:    1. Medicare annual wellness visit, initial (Primary)    2. Stage 3a chronic kidney disease (HCC)  -     Comprehensive Metabolic Panel; Future    3. Immunization due  -     Pneumococcal Conjugate Vaccine 20-Valent (PCV20)    4. Benign prostatic hyperplasia with nocturia    5. Erectile dysfunction, unspecified erectile dysfunction type    6. Actinic keratosis    7. Tick bite of right thigh, initial encounter      Cryotherapy procedure   patient noted to have actinic keratosis on left arm.  Treated with cryotherapy.  Verbal consent was obtained.  Risk and benefit was discussed with patient.  Small risk of infection and bleeding after treatment.  Treated with cryotherapy and destructed 1 premalignant lesion.  Patient tolerated procedure well without any acute complications.    Evaluate patient CKD, BPH, erectile dysfunction, and tick bite today.  Patient reporting tick bite without rash and symptoms will not treat.  Patient BPH well-controlled to continue current regimen.  Erectile dysfunction not controlled, recommend follow-up with urology.  I will have any other suggestions except for Viagra and Cialis.  I did mention Florin Gonda.  CKD decreased last year we have not rechecked it.  We will recheck it today.    Lipid panel not ordered since it was controlled last year.  Recommend patient stop by pharmacy to get second shingles shot.      Level 4 visit for evaluation of chronic erectile dysfunction  and BPH.  Rectal dysfunction not controlled.  BPH well controlled.  Also discussed CKD and AK diagnosis.  Also discussed tick bite.    If patient has worsening tick bite symptoms would send in doxycycline for 1 month.  Refill medications as needed.      Follow Up:   Return in about 1 year (around 8/17/2023) for Annual.     An After Visit Summary and PPPS were made available to the patient.

## 2022-09-06 ENCOUNTER — OFFICE VISIT (OUTPATIENT)
Dept: UROLOGY | Facility: CLINIC | Age: 67
End: 2022-09-06

## 2022-09-06 VITALS
TEMPERATURE: 97.6 F | OXYGEN SATURATION: 98 % | BODY MASS INDEX: 34.19 KG/M2 | RESPIRATION RATE: 18 BRPM | SYSTOLIC BLOOD PRESSURE: 142 MMHG | HEIGHT: 73 IN | DIASTOLIC BLOOD PRESSURE: 86 MMHG | WEIGHT: 258 LBS | HEART RATE: 69 BPM

## 2022-09-06 DIAGNOSIS — N52.9 ERECTILE DYSFUNCTION, UNSPECIFIED ERECTILE DYSFUNCTION TYPE: ICD-10-CM

## 2022-09-06 DIAGNOSIS — R97.20 ELEVATED PROSTATE SPECIFIC ANTIGEN (PSA): Primary | ICD-10-CM

## 2022-09-06 DIAGNOSIS — R39.9 LOWER URINARY TRACT SYMPTOMS (LUTS): ICD-10-CM

## 2022-09-06 PROCEDURE — 99214 OFFICE O/P EST MOD 30 MIN: CPT | Performed by: UROLOGY

## 2022-09-06 NOTE — PROGRESS NOTES
Follow Up Office Visit      Patient Name: Angel Avelar  : 1955   MRN: [id number]     Chief Complaint:    Chief Complaint   Patient presents with   • 6m f/up     LUTS. No symptoms cont.       History of Present Illness: Angel Avelar is a 67 y.o. male who presents today for follow up due to history of lower urinary tract symptoms and elevated PSA.  The patient was initially seen in 2021 for an elevated PSA value of 4.6.  6-month PSA recheck was identified to be elevated at 5.0.  He presents today for follow-up follow-up.  He reports stable lower urinary tract symptoms.  He is currently maintained on alpha-blocker therapy with tamsulosin.  Additionally, the patient has a history of erectile dysfunction currently treated with 20 mg tadalafil as needed.  He denies dysuria or hematuria.    Subjective      Review of System: Review of Systems   Constitutional: Negative for chills, fatigue, fever and unexpected weight change.   HENT: Negative for sore throat.    Eyes: Negative for visual disturbance.   Respiratory: Negative for cough, chest tightness and shortness of breath.    Cardiovascular: Negative for chest pain and leg swelling.   Gastrointestinal: Negative for blood in stool, constipation, diarrhea, nausea, rectal pain and vomiting.   Genitourinary: Negative for decreased urine volume, difficulty urinating, dysuria, enuresis, flank pain, frequency, genital sores, hematuria and urgency.   Musculoskeletal: Negative for back pain and joint swelling.   Skin: Negative for rash and wound.   Neurological: Negative for seizures, speech difficulty, weakness and headaches.   Psychiatric/Behavioral: Negative for confusion, sleep disturbance and suicidal ideas. The patient is not nervous/anxious.       I have reviewed the ROS documented by my clinical staff, updated as appropriate and I agree. Jaime Harley MD    I have reviewed and the following portions of the patient's history were updated as appropriate:  "past family history, past medical history, past social history, past surgical history and problem list.    Medications:     Current Outpatient Medications:   •  Cetirizine HCl (Allergy Relief) 10 MG capsule, Take  by mouth., Disp: , Rfl:   •  diphenhydrAMINE (BENADRYL) 25 mg capsule, Take 25 mg by mouth Every 6 (Six) Hours As Needed for Itching., Disp: , Rfl:   •  fluticasone (FLONASE) 50 MCG/ACT nasal spray, 2 sprays into the nostril(s) as directed by provider Daily., Disp: , Rfl:   •  multivitamin with minerals tablet tablet, Take 1 tablet by mouth Daily., Disp: , Rfl:   •  tadalafil (Cialis) 20 MG tablet, Take 1 tablet by mouth Daily As Needed for Erectile Dysfunction., Disp: 30 tablet, Rfl: 5  •  tamsulosin (FLOMAX) 0.4 MG capsule 24 hr capsule, Take 1 capsule by mouth Daily., Disp: 90 capsule, Rfl: 3    Allergies:   No Known Allergies      Objective     Physical Exam:   Vital Signs:   Vitals:    09/06/22 0950   BP: 142/86   Pulse: 69   Resp: 18   Temp: 97.6 °F (36.4 °C)   SpO2: 98%   Weight: 117 kg (258 lb)   Height: 185.4 cm (72.99\")     Body mass index is 34.05 kg/m².     Physical Exam  Vitals and nursing note reviewed.   Constitutional:       Appearance: Normal appearance.   HENT:      Head: Normocephalic and atraumatic.   Cardiovascular:      Comments: Well perfused  Pulmonary:      Effort: Pulmonary effort is normal.   Abdominal:      General: Abdomen is flat.      Palpations: Abdomen is soft.   Musculoskeletal:         General: Normal range of motion.   Skin:     General: Skin is warm and dry.   Neurological:      General: No focal deficit present.      Mental Status: He is alert and oriented to person, place, and time. Mental status is at baseline.   Psychiatric:         Mood and Affect: Mood normal.         Behavior: Behavior normal.         Thought Content: Thought content normal.         Judgment: Judgment normal.           Labs:   Brief Urine Lab Results  (Last result in the past 365 days)      Color "   Clarity   Blood   Leuk Est   Nitrite   Protein   CREAT   Urine HCG        02/17/22 1132 Yellow   Clear   Negative   Negative   Negative   Negative                      Lab Results   Component Value Date    GLUCOSE 93 08/17/2022    CALCIUM 9.5 08/17/2022     08/17/2022    K 4.4 08/17/2022    CO2 26.5 08/17/2022     08/17/2022    BUN 17 08/17/2022    CREATININE 1.23 08/17/2022    EGFRIFNONA 46 (L) 08/09/2021    BCR 13.8 08/17/2022    ANIONGAP 7.5 08/17/2022       Lab Results   Component Value Date    WBC 5.16 03/16/2021    HGB 13.2 03/16/2021    HCT 40.2 03/16/2021    MCV 84.5 03/16/2021     03/16/2021       Images:   No Images in the past 120 days found..    Measures:   Tobacco:   Angel Avelar  reports that he has never smoked. He has never used smokeless tobacco.. I have educated him on the risk of diseases from using tobacco products.    Assessment / Plan      Assessment/Plan:   67 y.o. male is seen today for follow up due to history of lower urinary tract symptoms, erectile dysfunction, elevated PSA.  Patient was initially seen in 8/2021 due to elevated PSA value of 4.6.  Recheck PSA at 6-month interval was identified to be 5.0.  He presents today for follow-up.  He continues to report lower urinary tract symptoms but stable on alpha-blocker therapy.  We have discussed his most recent PSA value of 5.0 which was elevated.  We have discussed the indication for repeat PSA at a 3-month interval for closer follow-up.  If continues to remain elevated we may further discuss adjunct testing such as 4K score multi parametric MRI.  He is understanding agreeable..     Diagnoses and all orders for this visit:    1. Elevated prostate specific antigen (PSA) (Primary)  -     PSA Total+% Free (Serial); Future    2. Lower urinary tract symptoms (LUTS)    3. Erectile dysfunction, unspecified erectile dysfunction type         Follow Up:   Return in about 3 months (around 12/6/2022) for Recheck, Follow up after  Labs.     I spent approximately 30 minutes providing clinical care for this patient; including review of patient's chart and provider documentation, face to face time spent with patient in examination room (obtaining history, performing physical exam, discussing diagnosis and management options), placing orders, and completing patient documentation.     Jaime Harley MD  [facility] [place]

## 2022-11-22 ENCOUNTER — LAB (OUTPATIENT)
Dept: LAB | Facility: HOSPITAL | Age: 67
End: 2022-11-22

## 2022-11-22 DIAGNOSIS — R97.20 ELEVATED PROSTATE SPECIFIC ANTIGEN (PSA): ICD-10-CM

## 2022-11-22 PROCEDURE — 84153 ASSAY OF PSA TOTAL: CPT

## 2022-11-22 PROCEDURE — 36415 COLL VENOUS BLD VENIPUNCTURE: CPT

## 2022-11-22 PROCEDURE — 84154 ASSAY OF PSA FREE: CPT

## 2022-11-25 LAB
PSA FREE MFR SERPL: 30.5 %
PSA FREE SERPL-MCNC: 1.31 NG/ML
PSA SERPL-MCNC: 4.3 NG/ML (ref 0–4)

## 2022-12-06 ENCOUNTER — OFFICE VISIT (OUTPATIENT)
Dept: UROLOGY | Facility: CLINIC | Age: 67
End: 2022-12-06

## 2022-12-06 VITALS — WEIGHT: 258 LBS | BODY MASS INDEX: 34.19 KG/M2 | HEIGHT: 73 IN

## 2022-12-06 DIAGNOSIS — N52.9 ERECTILE DYSFUNCTION, UNSPECIFIED ERECTILE DYSFUNCTION TYPE: ICD-10-CM

## 2022-12-06 DIAGNOSIS — R39.9 LOWER URINARY TRACT SYMPTOMS (LUTS): ICD-10-CM

## 2022-12-06 DIAGNOSIS — R97.20 ELEVATED PROSTATE SPECIFIC ANTIGEN (PSA): Primary | ICD-10-CM

## 2022-12-06 PROCEDURE — 99214 OFFICE O/P EST MOD 30 MIN: CPT | Performed by: UROLOGY

## 2022-12-06 NOTE — PROGRESS NOTES
Elevated PSA Office Visit      Patient Name: Angel Avelar  : 1955   MRN: [id number]     Chief Complaint:  Elevated PSA.      History of Present Illness: Angel Avelar is a 67 y.o. male male who presents today for follow up due to history of lower urinary tract symptoms and elevated PSA.      The patient was initially seen in 2021 for an elevated PSA value of 4.6.  PSA recheck was identified to be elevated at 5.0. Seen in 2022 and 3 month follow up with PSA identified to be 4.3 in 2022.     He reports stable lower urinary tract symptoms.  He is currently maintained on alpha-blocker therapy with tamsulosin.    He denies dysuria or hematuria.     The patient has a history of erectile dysfunction currently treated with 20 mg tadalafil as needed.      Subjective      Review of System: Review of Systems   Constitutional: Negative for chills, fatigue, fever and unexpected weight change.   HENT: Negative for sore throat.    Eyes: Negative for visual disturbance.   Respiratory: Negative for cough, chest tightness and shortness of breath.    Cardiovascular: Negative for chest pain and leg swelling.   Gastrointestinal: Negative for blood in stool, constipation, diarrhea, nausea, rectal pain and vomiting.   Genitourinary: Positive for frequency and urgency. Negative for decreased urine volume, difficulty urinating, dysuria, enuresis, flank pain, genital sores and hematuria.   Musculoskeletal: Negative for back pain and joint swelling.   Skin: Negative for rash and wound.   Neurological: Negative for seizures, speech difficulty, weakness and headaches.   Psychiatric/Behavioral: Negative for confusion, sleep disturbance and suicidal ideas. The patient is not nervous/anxious.       I have reviewed the ROS documented by my clinical staff, updated as appropriate and I agree. Jaime Harley MD    Past Medical History:   Past Medical History:   Diagnosis Date   • Allergic        Past Surgical History:   Past  Surgical History:   Procedure Laterality Date   • INNER EAR SURGERY         Family History:   Family History   Problem Relation Age of Onset   • Diabetes Mother    • Cancer Father    • Prostate cancer Father    • Prostate cancer Brother    • Prostate cancer Brother    • Diabetes Brother    • Skin cancer Brother    • No Known Problems Maternal Grandmother    • No Known Problems Maternal Grandfather    • No Known Problems Paternal Grandmother    • No Known Problems Paternal Grandfather    • No Known Problems Son    • No Known Problems Son    • No Known Problems Son        Social History:   Social History     Socioeconomic History   • Marital status:    Tobacco Use   • Smoking status: Never   • Smokeless tobacco: Never   Vaping Use   • Vaping Use: Never used   Substance and Sexual Activity   • Alcohol use: Yes     Alcohol/week: 1.0 standard drink     Types: 1 Glasses of wine per week     Comment: every now and then    • Drug use: Never   • Sexual activity: Yes     Partners: Female     Birth control/protection: None       Medications:     Current Outpatient Medications:   •  Ascorbic Acid (VITAMIN C PO), Vitamin C, Disp: , Rfl:   •  Cetirizine HCl (Allergy Relief) 10 MG capsule, Take  by mouth., Disp: , Rfl:   •  diphenhydrAMINE (BENADRYL) 25 mg capsule, Take 25 mg by mouth Every 6 (Six) Hours As Needed for Itching., Disp: , Rfl:   •  fluticasone (FLONASE) 50 MCG/ACT nasal spray, 2 sprays into the nostril(s) as directed by provider Daily., Disp: , Rfl:   •  multivitamin with minerals tablet tablet, Take 1 tablet by mouth Daily., Disp: , Rfl:   •  tadalafil (Cialis) 20 MG tablet, Take 1 tablet by mouth Daily As Needed for Erectile Dysfunction., Disp: 30 tablet, Rfl: 5  •  tamsulosin (FLOMAX) 0.4 MG capsule 24 hr capsule, Take 1 capsule by mouth Daily., Disp: 90 capsule, Rfl: 3  •  VITAMIN E PO, vitamin E, Disp: , Rfl:     Allergies:   No Known Allergies      Objective     Physical Exam:   Vital Signs:   Vitals:     "12/06/22 1424   Weight: 117 kg (258 lb)   Height: 185.4 cm (72.99\")   PainSc: 0-No pain     Body mass index is 34.05 kg/m².     Physical Exam  Vitals and nursing note reviewed.   Constitutional:       Appearance: Normal appearance.   HENT:      Head: Normocephalic and atraumatic.   Cardiovascular:      Comments: Well perfused  Pulmonary:      Effort: Pulmonary effort is normal.   Abdominal:      General: Abdomen is flat.      Palpations: Abdomen is soft.   Musculoskeletal:         General: Normal range of motion.   Skin:     General: Skin is warm and dry.   Neurological:      General: No focal deficit present.      Mental Status: He is alert and oriented to person, place, and time. Mental status is at baseline.   Psychiatric:         Mood and Affect: Mood normal.         Behavior: Behavior normal.         Thought Content: Thought content normal.         Judgment: Judgment normal.          Labs:   Hematocrit (%)   Date Value   03/16/2021 40.2       Lab Results   Component Value Date    PSA 4.3 (H) 11/22/2022    PSA 5.0 (H) 04/12/2022    PSA 4.620 (H) 08/09/2021       Images:   No Images in the past 120 days found..    Measures:   Tobacco:   Angel Avelar  reports that he has never smoked. He has never used smokeless tobacco.. I have educated him on the risk of diseases from using tobacco products.    Assessment / Plan      Assessment:     Mr. Avelar is a 67 y.o. male with elevated PSA.  PSA value today is down to 4.3 from 5.0.  We have continued to discuss indication for PSA surveillance at this time.  He will follow-up in 6 months for repeat PSA continue discussion of lower urinary tract symptoms and symptoms related to erectile dysfunction.    Diagnoses and all orders for this visit:    1. Elevated prostate specific antigen (PSA) (Primary)  -     Cancel: PSA Total+% Free (Serial); Future  -     PSA Total+% Free (Serial); Future    2. Lower urinary tract symptoms (LUTS)    3. Erectile dysfunction, unspecified " erectile dysfunction type           Follow Up:   Return in about 5 months (around 5/6/2023).    I spent approximately 30 minutes providing clinical care for this patient; including review of patient's chart and provider documentation, face to face time spent with patient in examination room (obtaining history, performing physical exam, discussing diagnosis and management options), placing orders, and completing patient documentation.     Jaime Harley MD  [facility]

## 2023-01-24 ENCOUNTER — LAB (OUTPATIENT)
Dept: LAB | Facility: HOSPITAL | Age: 68
End: 2023-01-24
Payer: MEDICARE

## 2023-01-24 ENCOUNTER — OFFICE VISIT (OUTPATIENT)
Dept: FAMILY MEDICINE CLINIC | Facility: CLINIC | Age: 68
End: 2023-01-24
Payer: MEDICARE

## 2023-01-24 VITALS
SYSTOLIC BLOOD PRESSURE: 142 MMHG | BODY MASS INDEX: 32.97 KG/M2 | DIASTOLIC BLOOD PRESSURE: 82 MMHG | WEIGHT: 248.8 LBS | HEIGHT: 73 IN | OXYGEN SATURATION: 99 % | HEART RATE: 67 BPM | TEMPERATURE: 98.7 F

## 2023-01-24 DIAGNOSIS — Z01.818 PRE-OP EXAM: Primary | ICD-10-CM

## 2023-01-24 DIAGNOSIS — Z01.818 PRE-OP EXAM: ICD-10-CM

## 2023-01-24 LAB
DEPRECATED RDW RBC AUTO: 43.7 FL (ref 37–54)
ERYTHROCYTE [DISTWIDTH] IN BLOOD BY AUTOMATED COUNT: 14.4 % (ref 12.3–15.4)
HCT VFR BLD AUTO: 39.5 % (ref 37.5–51)
HGB BLD-MCNC: 12.7 G/DL (ref 13–17.7)
MCH RBC QN AUTO: 27.4 PG (ref 26.6–33)
MCHC RBC AUTO-ENTMCNC: 32.2 G/DL (ref 31.5–35.7)
MCV RBC AUTO: 85.1 FL (ref 79–97)
PLATELET # BLD AUTO: 277 10*3/MM3 (ref 140–450)
PMV BLD AUTO: 10 FL (ref 6–12)
RBC # BLD AUTO: 4.64 10*6/MM3 (ref 4.14–5.8)
WBC NRBC COR # BLD: 4.81 10*3/MM3 (ref 3.4–10.8)

## 2023-01-24 PROCEDURE — 85027 COMPLETE CBC AUTOMATED: CPT

## 2023-01-24 PROCEDURE — 36415 COLL VENOUS BLD VENIPUNCTURE: CPT

## 2023-01-24 PROCEDURE — 99213 OFFICE O/P EST LOW 20 MIN: CPT | Performed by: PHYSICIAN ASSISTANT

## 2023-01-24 PROCEDURE — 80048 BASIC METABOLIC PNL TOTAL CA: CPT

## 2023-01-24 NOTE — PROGRESS NOTES
Follow Up Office Visit      Date: 2023   Patient Name: Angel Avelar  : 1955   MRN: [id number]     Chief Complaint:    Chief Complaint   Patient presents with   • Pre-op Exam     EKG and Pre-OP  Surgery is the 8th        History of Present Illness: Angel Avelar is a 67 y.o. male who is here today to follow up with pre-op exam. Having a right tympanostomy with Dr. Higuera on . No heart or lung disease, no anesthesia probvlems.  He denies any concerns or other problems today.      Subjective      Review of systems:  Review of Systems   Constitutional: Negative for fatigue and fever.   HENT: Negative for trouble swallowing.    Eyes: Negative for visual disturbance.   Respiratory: Negative for shortness of breath.    Cardiovascular: Negative for chest pain and leg swelling.   Gastrointestinal: Negative for abdominal pain.        I have reviewed and the following portions of the patient's history were updated as appropriate: past family history, past medical history, past social history, past surgical history and problem list.    Medications:     Current Outpatient Medications:   •  Cetirizine HCl (Allergy Relief) 10 MG capsule, Take  by mouth., Disp: , Rfl:   •  diphenhydrAMINE (BENADRYL) 25 mg capsule, Take 25 mg by mouth Every 6 (Six) Hours As Needed for Itching., Disp: , Rfl:   •  fluticasone (FLONASE) 50 MCG/ACT nasal spray, 2 sprays into the nostril(s) as directed by provider Daily., Disp: , Rfl:   •  multivitamin with minerals tablet tablet, Take 1 tablet by mouth Daily., Disp: , Rfl:   •  tadalafil (Cialis) 20 MG tablet, Take 1 tablet by mouth Daily As Needed for Erectile Dysfunction., Disp: 30 tablet, Rfl: 5  •  tamsulosin (FLOMAX) 0.4 MG capsule 24 hr capsule, Take 1 capsule by mouth Daily., Disp: 90 capsule, Rfl: 3  •  VITAMIN E PO, vitamin E, Disp: , Rfl:   •  Ascorbic Acid (VITAMIN C PO), Vitamin C, Disp: , Rfl:     Allergies:   No Known Allergies    Objective     Vital  "Signs:   Vitals:    01/24/23 1527   BP: 142/82   Pulse: 67   Temp: 98.7 °F (37.1 °C)   TempSrc: Infrared   SpO2: 99%   Weight: 113 kg (248 lb 12.8 oz)   Height: 185.4 cm (73\")   PainSc: 0-No pain     Body mass index is 32.83 kg/m².   BMI is >= 30 and <35. (Class 1 Obesity). The following options were offered after discussion;: weight loss educational material (shared in after visit summary)      Physical Exam:   Physical Exam  Vitals and nursing note reviewed.   Constitutional:       Appearance: Normal appearance.   HENT:      Head: Normocephalic and atraumatic.      Right Ear: Ear canal normal. Tympanic membrane is perforated.      Left Ear: Tympanic membrane and ear canal normal.      Nose: No congestion or rhinorrhea.      Mouth/Throat:      Mouth: Mucous membranes are moist.      Pharynx: Oropharynx is clear. No posterior oropharyngeal erythema.   Cardiovascular:      Rate and Rhythm: Normal rate and regular rhythm.   Pulmonary:      Effort: Pulmonary effort is normal.      Breath sounds: Normal breath sounds. No decreased breath sounds, wheezing, rhonchi or rales.   Musculoskeletal:      Cervical back: Neck supple.      Right lower leg: No edema.      Left lower leg: No edema.   Lymphadenopathy:      Cervical: No cervical adenopathy.   Neurological:      General: No focal deficit present.      Mental Status: He is alert.   Psychiatric:         Mood and Affect: Mood normal.       ECG 12 Lead    Date/Time: 1/30/2023 8:08 AM  Performed by: Gabriela Mendoza PA  Authorized by: Gabriela Mendoza PA   Comparison: compared with previous ECG from 3/16/2021  Rhythm: sinus rhythm  Rate: normal  ST Segments: ST segments normal  T Waves: T waves normal    Clinical impression: normal ECG  Comments: Note difficulty getting leads to stick to chest due to increased hair  - artifact on EKG.            Assessment / Plan      Assessment/Plan:   Diagnoses and all orders for this visit:    1. Pre-op exam (Primary)  -     ECG 12 " Lead  -     Basic metabolic panel; Future  -     CBC No Differential; Future    Labs today.  EKG today.  Patient cleared for surgery.    Follow Up:   No follow-ups on file.    Gabriela Mendoza PA-C   [facility] [place]

## 2023-01-25 LAB
ANION GAP SERPL CALCULATED.3IONS-SCNC: 9 MMOL/L (ref 5–15)
BUN SERPL-MCNC: 16 MG/DL (ref 8–23)
BUN/CREAT SERPL: 14.8 (ref 7–25)
CALCIUM SPEC-SCNC: 9.2 MG/DL (ref 8.6–10.5)
CHLORIDE SERPL-SCNC: 104 MMOL/L (ref 98–107)
CO2 SERPL-SCNC: 29 MMOL/L (ref 22–29)
CREAT SERPL-MCNC: 1.08 MG/DL (ref 0.76–1.27)
EGFRCR SERPLBLD CKD-EPI 2021: 75.2 ML/MIN/1.73
GLUCOSE SERPL-MCNC: 89 MG/DL (ref 65–99)
POTASSIUM SERPL-SCNC: 4.2 MMOL/L (ref 3.5–5.2)
SODIUM SERPL-SCNC: 142 MMOL/L (ref 136–145)

## 2023-01-30 PROCEDURE — 93000 ELECTROCARDIOGRAM COMPLETE: CPT | Performed by: PHYSICIAN ASSISTANT

## 2023-02-10 ENCOUNTER — TELEPHONE (OUTPATIENT)
Dept: UROLOGY | Facility: CLINIC | Age: 68
End: 2023-02-10
Payer: MEDICARE

## 2023-02-10 ENCOUNTER — HOSPITAL ENCOUNTER (EMERGENCY)
Facility: HOSPITAL | Age: 68
Discharge: HOME | End: 2023-02-10
Attending: EMERGENCY MEDICINE | Admitting: EMERGENCY MEDICINE
Payer: MEDICARE

## 2023-02-10 VITALS
WEIGHT: 245 LBS | RESPIRATION RATE: 18 BRPM | SYSTOLIC BLOOD PRESSURE: 170 MMHG | HEART RATE: 71 BPM | DIASTOLIC BLOOD PRESSURE: 65 MMHG | TEMPERATURE: 97.9 F | HEIGHT: 73 IN | OXYGEN SATURATION: 98 % | BODY MASS INDEX: 32.47 KG/M2

## 2023-02-10 DIAGNOSIS — T50.905A MEDICATION SIDE EFFECT, INITIAL ENCOUNTER: ICD-10-CM

## 2023-02-10 DIAGNOSIS — R33.8 ACUTE URINARY RETENTION: Primary | ICD-10-CM

## 2023-02-10 DIAGNOSIS — R97.20 ELEVATED PROSTATE SPECIFIC ANTIGEN (PSA): ICD-10-CM

## 2023-02-10 DIAGNOSIS — Z87.438 HISTORY OF BPH: ICD-10-CM

## 2023-02-10 LAB
ALBUMIN SERPL-MCNC: 4.1 G/DL (ref 3.5–5.2)
ALBUMIN/GLOB SERPL: 1.6 G/DL
ALP SERPL-CCNC: 54 U/L (ref 39–117)
ALT SERPL W P-5'-P-CCNC: 22 U/L (ref 1–41)
ANION GAP SERPL CALCULATED.3IONS-SCNC: 11 MMOL/L (ref 5–15)
AST SERPL-CCNC: 29 U/L (ref 1–40)
BACTERIA UR QL AUTO: ABNORMAL /HPF
BASOPHILS # BLD AUTO: 0.02 10*3/MM3 (ref 0–0.2)
BASOPHILS NFR BLD AUTO: 0.3 % (ref 0–1.5)
BILIRUB SERPL-MCNC: 0.6 MG/DL (ref 0–1.2)
BILIRUB UR QL STRIP: NEGATIVE
BUN SERPL-MCNC: 17 MG/DL (ref 8–23)
BUN/CREAT SERPL: 19.8 (ref 7–25)
CALCIUM SPEC-SCNC: 9.1 MG/DL (ref 8.6–10.5)
CHLORIDE SERPL-SCNC: 101 MMOL/L (ref 98–107)
CLARITY UR: CLEAR
CO2 SERPL-SCNC: 24 MMOL/L (ref 22–29)
COLOR UR: YELLOW
CREAT SERPL-MCNC: 0.86 MG/DL (ref 0.76–1.27)
DEPRECATED RDW RBC AUTO: 45 FL (ref 37–54)
EGFRCR SERPLBLD CKD-EPI 2021: 94.9 ML/MIN/1.73
EOSINOPHIL # BLD AUTO: 0.07 10*3/MM3 (ref 0–0.4)
EOSINOPHIL NFR BLD AUTO: 1.1 % (ref 0.3–6.2)
ERYTHROCYTE [DISTWIDTH] IN BLOOD BY AUTOMATED COUNT: 14.7 % (ref 12.3–15.4)
GLOBULIN UR ELPH-MCNC: 2.6 GM/DL
GLUCOSE SERPL-MCNC: 123 MG/DL (ref 65–99)
GLUCOSE UR STRIP-MCNC: NEGATIVE MG/DL
HCT VFR BLD AUTO: 36.8 % (ref 37.5–51)
HGB BLD-MCNC: 12.1 G/DL (ref 13–17.7)
HGB UR QL STRIP.AUTO: ABNORMAL
HYALINE CASTS UR QL AUTO: ABNORMAL /LPF
IMM GRANULOCYTES # BLD AUTO: 0.01 10*3/MM3 (ref 0–0.05)
IMM GRANULOCYTES NFR BLD AUTO: 0.2 % (ref 0–0.5)
KETONES UR QL STRIP: NEGATIVE
LEUKOCYTE ESTERASE UR QL STRIP.AUTO: NEGATIVE
LYMPHOCYTES # BLD AUTO: 1.79 10*3/MM3 (ref 0.7–3.1)
LYMPHOCYTES NFR BLD AUTO: 27.8 % (ref 19.6–45.3)
MCH RBC QN AUTO: 27.8 PG (ref 26.6–33)
MCHC RBC AUTO-ENTMCNC: 32.9 G/DL (ref 31.5–35.7)
MCV RBC AUTO: 84.4 FL (ref 79–97)
MONOCYTES # BLD AUTO: 0.48 10*3/MM3 (ref 0.1–0.9)
MONOCYTES NFR BLD AUTO: 7.4 % (ref 5–12)
NEUTROPHILS NFR BLD AUTO: 4.08 10*3/MM3 (ref 1.7–7)
NEUTROPHILS NFR BLD AUTO: 63.2 % (ref 42.7–76)
NITRITE UR QL STRIP: NEGATIVE
NRBC BLD AUTO-RTO: 0 /100 WBC (ref 0–0.2)
PH UR STRIP.AUTO: 5.5 [PH] (ref 5–8)
PLATELET # BLD AUTO: 210 10*3/MM3 (ref 140–450)
PMV BLD AUTO: 9.4 FL (ref 6–12)
POTASSIUM SERPL-SCNC: 4 MMOL/L (ref 3.5–5.2)
PROT SERPL-MCNC: 6.7 G/DL (ref 6–8.5)
PROT UR QL STRIP: NEGATIVE
RBC # BLD AUTO: 4.36 10*6/MM3 (ref 4.14–5.8)
RBC # UR STRIP: ABNORMAL /HPF
REF LAB TEST METHOD: ABNORMAL
SODIUM SERPL-SCNC: 136 MMOL/L (ref 136–145)
SP GR UR STRIP: 1.02 (ref 1–1.03)
SQUAMOUS #/AREA URNS HPF: ABNORMAL /HPF
UROBILINOGEN UR QL STRIP: ABNORMAL
WBC # UR STRIP: ABNORMAL /HPF
WBC NRBC COR # BLD: 6.45 10*3/MM3 (ref 3.4–10.8)

## 2023-02-10 PROCEDURE — 36415 COLL VENOUS BLD VENIPUNCTURE: CPT

## 2023-02-10 PROCEDURE — P9612 CATHETERIZE FOR URINE SPEC: HCPCS

## 2023-02-10 PROCEDURE — 81001 URINALYSIS AUTO W/SCOPE: CPT | Performed by: PHYSICIAN ASSISTANT

## 2023-02-10 PROCEDURE — 85025 COMPLETE CBC W/AUTO DIFF WBC: CPT | Performed by: PHYSICIAN ASSISTANT

## 2023-02-10 PROCEDURE — 80053 COMPREHEN METABOLIC PANEL: CPT | Performed by: PHYSICIAN ASSISTANT

## 2023-02-10 PROCEDURE — 99283 EMERGENCY DEPT VISIT LOW MDM: CPT

## 2023-02-10 NOTE — TELEPHONE ENCOUNTER
Pt was seen in ER yesterday 2/09/23, and left with a betancourt catheter for retention. Follow up is needed with Dr. Harley for a voiding trial, but note did not specify a time frame. Please advise.

## 2023-02-10 NOTE — ED PROVIDER NOTES
EMERGENCY DEPARTMENT ENCOUNTER    Pt Name: Angel Avelar  MRN: [id number]  Pt :   1955  Room Number:    Date of encounter:  2/10/2023  PCP: True Valladares DO  ED Provider: MELY Martin    Historian: Patient    HPI:  Chief Complaint: Acute Urinary Retention    Context: Angel Avelar is a 67 y.o. male who presents to the ED c/o urinary retention. Patient reports that he had surgery to remove a cholesteatoma performed by Dr. Rolanod Higuera on 2023. He shares that the surgery went well but that after taking some of his prescribed medication for pain he started to have difficulty with urination. He reports decreased urinary output and increased pain in his lower abdomen. He states that today he tried to urinate but that only a few drops came out. No additional associated symptoms on exam.   HPI     REVIEW OF SYSTEMS  A chief complaint appropriate review of systems was completed and is negative except as noted in the HPI.     PAST MEDICAL HISTORY  Past Medical History:   Diagnosis Date   • Allergic    • Benign prostatic hyperplasia About    • Erectile dysfunction About  to        PAST SURGICAL HISTORY  Past Surgical History:   Procedure Laterality Date   • COLONOSCOPY  Spring of 2021   • INNER EAR SURGERY         FAMILY HISTORY  Family History   Problem Relation Age of Onset   • Diabetes Mother    • Cancer Father    • Prostate cancer Father    • Prostate cancer Brother    • Cancer Brother         Prostate   • Prostate cancer Brother    • Diabetes Brother    • Skin cancer Brother    • No Known Problems Maternal Grandmother    • No Known Problems Maternal Grandfather    • No Known Problems Paternal Grandmother    • No Known Problems Paternal Grandfather    • No Known Problems Son    • No Known Problems Son    • No Known Problems Son    • Cancer Brother         Prostate and gastro cancer       SOCIAL HISTORY  Social History     Socioeconomic History   • Marital status:    Tobacco  Use   • Smoking status: Never     Passive exposure: Never   • Smokeless tobacco: Never   Vaping Use   • Vaping Use: Never used   Substance and Sexual Activity   • Alcohol use: Yes     Alcohol/week: 1.0 standard drink     Types: 1 Glasses of wine per week     Comment: every now and then    • Drug use: Never   • Sexual activity: Yes     Partners: Female     Birth control/protection: None       ALLERGIES  Patient has no known allergies.    PHYSICAL EXAM  Physical Exam  GENERAL:   Mild distress  HENT: Nares patent.  EYES: No scleral icterus.  CV: Regular rhythm, regular rate.  RESPIRATORY: Normal effort.  No audible wheezes, rales or rhonchi.  ABDOMEN: Soft, distended bladder, lower abdominal pain  MUSCULOSKELETAL: No deformities.   NEURO: Alert, moves all extremities, follows commands.  SKIN: Warm, dry, no rash visualized.    I have reviewed the triage vital signs and nursing notes.    Physical Exam     LAB RESULTS  Results for orders placed or performed during the hospital encounter of 02/10/23   Comprehensive Metabolic Panel    Specimen: Blood   Result Value Ref Range    Glucose 123 (H) 65 - 99 mg/dL    BUN 17 8 - 23 mg/dL    Creatinine 0.86 0.76 - 1.27 mg/dL    Sodium 136 136 - 145 mmol/L    Potassium 4.0 3.5 - 5.2 mmol/L    Chloride 101 98 - 107 mmol/L    CO2 24.0 22.0 - 29.0 mmol/L    Calcium 9.1 8.6 - 10.5 mg/dL    Total Protein 6.7 6.0 - 8.5 g/dL    Albumin 4.1 3.5 - 5.2 g/dL    ALT (SGPT) 22 1 - 41 U/L    AST (SGOT) 29 1 - 40 U/L    Alkaline Phosphatase 54 39 - 117 U/L    Total Bilirubin 0.6 0.0 - 1.2 mg/dL    Globulin 2.6 gm/dL    A/G Ratio 1.6 g/dL    BUN/Creatinine Ratio 19.8 7.0 - 25.0    Anion Gap 11.0 5.0 - 15.0 mmol/L    eGFR 94.9 >60.0 mL/min/1.73   Urinalysis With Microscopic If Indicated (No Culture) - Urine, Catheter    Specimen: Urine, Catheter   Result Value Ref Range    Color, UA Yellow Yellow, Straw    Appearance, UA Clear Clear    pH, UA 5.5 5.0 - 8.0    Specific Gravity, UA 1.017 1.001 - 1.030     Glucose, UA Negative Negative    Ketones, UA Negative Negative    Bilirubin, UA Negative Negative    Blood, UA Moderate (2+) (A) Negative    Protein, UA Negative Negative    Leuk Esterase, UA Negative Negative    Nitrite, UA Negative Negative    Urobilinogen, UA 0.2 E.U./dL 0.2 - 1.0 E.U./dL   CBC Auto Differential    Specimen: Blood   Result Value Ref Range    WBC 6.45 3.40 - 10.80 10*3/mm3    RBC 4.36 4.14 - 5.80 10*6/mm3    Hemoglobin 12.1 (L) 13.0 - 17.7 g/dL    Hematocrit 36.8 (L) 37.5 - 51.0 %    MCV 84.4 79.0 - 97.0 fL    MCH 27.8 26.6 - 33.0 pg    MCHC 32.9 31.5 - 35.7 g/dL    RDW 14.7 12.3 - 15.4 %    RDW-SD 45.0 37.0 - 54.0 fl    MPV 9.4 6.0 - 12.0 fL    Platelets 210 140 - 450 10*3/mm3    Neutrophil % 63.2 42.7 - 76.0 %    Lymphocyte % 27.8 19.6 - 45.3 %    Monocyte % 7.4 5.0 - 12.0 %    Eosinophil % 1.1 0.3 - 6.2 %    Basophil % 0.3 0.0 - 1.5 %    Immature Grans % 0.2 0.0 - 0.5 %    Neutrophils, Absolute 4.08 1.70 - 7.00 10*3/mm3    Lymphocytes, Absolute 1.79 0.70 - 3.10 10*3/mm3    Monocytes, Absolute 0.48 0.10 - 0.90 10*3/mm3    Eosinophils, Absolute 0.07 0.00 - 0.40 10*3/mm3    Basophils, Absolute 0.02 0.00 - 0.20 10*3/mm3    Immature Grans, Absolute 0.01 0.00 - 0.05 10*3/mm3    nRBC 0.0 0.0 - 0.2 /100 WBC   Urinalysis, Microscopic Only - Urine, Catheter    Specimen: Urine, Catheter   Result Value Ref Range    RBC, UA Too Numerous to Count (A) None Seen, 0-2 /HPF    WBC, UA None Seen None Seen, 0-2 /HPF    Bacteria, UA None Seen None Seen, Trace /HPF    Squamous Epithelial Cells, UA None Seen None Seen, 0-2 /HPF    Hyaline Casts, UA None Seen 0 - 6 /LPF    Methodology Automated Microscopy        If labs were ordered, I independently reviewed the results and considered them in treating the patient.    RADIOLOGY  No orders to display     [] Radiologist's Report Reviewed:  I ordered and independently reviewed the above noted radiographic studies.  See radiologist's dictation for official  interpretation.      PROCEDURES    Procedures    No orders to display       MEDICATIONS GIVEN IN ER    Medications - No data to display    MEDICAL DECISION MAKING, PROGRESS, and CONSULTS   Medical Decision Making  Acute urinary retention: complicated acute illness or injury  Elevated prostate specific antigen (PSA): chronic illness or injury  History of BPH: chronic illness or injury  Medication side effect, initial encounter: complicated acute illness or injury  Amount and/or Complexity of Data Reviewed  Labs: ordered.          All labs have been independently reviewed by me.  All radiology studies have been reviewed by me and the radiologist dictating the report.  All EKG's have been independently viewed by me.    [] Discussed with radiology regarding test interpretation:    Discussion below represents my analysis of pertinent findings related to patient's condition, differential diagnosis, treatment plan and final disposition.    Differential diagnosis:  The differential diagnosis associated with the patient's presentation includes: BPH, urethral stricture, bladder calculi, foreign body (urethreal or bladder), urethral injury, infection, medication side effect.     Additional sources  Discussed/ obtained information from independent historians:   [x] Spouse  [] Parent  [] Family member  [] Friend  [] EMS   [] Other:  External (non-ED) record review:   [] Inpatient record:   [] Office record:   [x] Outpatient record: Urology records demonstrating elevated PSA, BPH   [] Prior Outpatient labs:   [] Prior Outpatient radiology:   [] Primary Care record:   [] Outside ED record:   [x] Other: surgery record from 2/8/2023 procedure  Patient's care impacted by:   [] Diabetes  [] Hypertension  [] Hyperlipidemia  [] Coronary Artery Disease   [] COPD   [] Cancer   [] Tobacco Abuse   [] Substance Abuse    [x] Other: BPH  Care significantly affected by Social Determinants of Health (housing and economic circumstances,  unemployment)    [] Yes     [x] No   If yes, Patient's care significantly limited by  Social Determinants of Health including:   [] Inadequate housing   [] Low income   [] Alcoholism and drug addiction in family   [] Problems related to primary support group   [] Unemployment   [] Problems related to employment   [] Other Social Determinants of Health:     Shared decision making: I had a discussion with the patient/family regarding diagnosis, diagnostic results, treatment plan, and medications.  The patient/family indicated understanding of these instructions.  I spent adequate time at the bedside preceding discharge necessary to personally discuss the aftercare instructions, giving patient education, providing explanations of the results of our evaluations/findings, and my decision making to assure that the patient/family understand the plan of care.  Time was allotted to answer questions at that time and throughout the ED course.  Emphasis was placed on timely follow-up after discharge.  I also discussed the potential for the development of an acute emergent condition requiring further evaluation, admission, or even surgical intervention. I discussed that we found nothing during the visit today indicating the need for further workup, admission, or the presence of an unstable medical condition.  I encouraged the patient to return to the emergency department immediately for ANY concerns, worsening, new complaints, or if symptoms persist and unable to seek follow-up in a timely fashion.  The patient/family expressed understanding and agreement with this plan.  The patient will follow-up with UROLOGY and primary care for reevaluation.     Orders placed during this visit:  Orders Placed This Encounter   Procedures   • Comprehensive Metabolic Panel   • Urinalysis With Microscopic If Indicated (No Culture) - Urine, Catheter   • CBC Auto Differential   • Urinalysis, Microscopic Only - Urine, Clean Catch   • CBC &  Differential     Additional orders considered but not ordered:  The following testing was considered but ultimately not selected after discussion with patient/family: CT scan of abdomen and pelvis considered, patient with complete symptomatic relief and significant output following insertion of catheter.     ED Course:    ED Course as of 02/10/23 0444   Fri Feb 10, 2023   0439 In summary this is a 67-year-old male who presents to the ED for evaluation of acute urinary retention.  Patient has had urinary retention since starting his pain medication postop.  Began to have discomfort in his bladder and distention in his abdomen along with difficulty with urination that worsened today.  On presentation patient with approximately 885 mL in the bladder via bladder scan.  Betancourt catheter anchored with greater than 1500 mL output. Labs/urinalysis obtained and reviewed demonstrate no acute or emergent abnormalities.  Betancourt left anchored and patient instructed on betancourt care and the need for outpatient follow up for voiding trial with urology. At time of discharge disposition patient is afebrile, nontoxic appearing, vital signs stable and able to maintain O2 sats of 98% on room air. Patient will be discharged home with symptomatic care and outpatient follow up.  [JG]      ED Course User Index  [JG] Francisco Camp PA            DIAGNOSIS  Final diagnoses:   Acute urinary retention   Medication side effect, initial encounter   History of BPH   Elevated prostate specific antigen (PSA)       DISPOSITION    ED Disposition     ED Disposition   Discharge    Condition   Stable    Comment   --             Please note that portions of this document were completed with voice recognition software.      Francisco Camp PA  02/10/23 0444

## 2023-02-10 NOTE — DISCHARGE INSTRUCTIONS
Symptomatic care is recommended. Take all medications as prescribed and instructed. Follow up with Urology in the morning and primary care as directed or return to Emergency Department with worsening of symptoms.

## 2023-02-10 NOTE — TELEPHONE ENCOUNTER
I called the patient to let him know that Dr. Harley wants him to keep the catheter in place for one week.  He will come in on 2/16/23 @ 8 on the nurse schedule for a voiding trial.

## 2023-02-13 ENCOUNTER — TELEPHONE (OUTPATIENT)
Dept: UROLOGY | Facility: CLINIC | Age: 68
End: 2023-02-13
Payer: MEDICARE

## 2023-02-13 NOTE — TELEPHONE ENCOUNTER
Pt called asking if he could get his catheter removed sooner than his appointment on 02/16, I let him know that with urinary retention he would have to keep his catheter in for a week and then Thursday morning we would remove it and do a voiding trial to make sure he can urinate.

## 2023-02-13 NOTE — TELEPHONE ENCOUNTER
PT called and would like to know if he can get his catheter removed sooner than his scheduled appt. On 2/16/23. Please advise.

## 2023-02-16 ENCOUNTER — CLINICAL SUPPORT (OUTPATIENT)
Dept: UROLOGY | Facility: CLINIC | Age: 68
End: 2023-02-16
Payer: MEDICARE

## 2023-02-16 ENCOUNTER — TELEPHONE (OUTPATIENT)
Dept: UROLOGY | Facility: CLINIC | Age: 68
End: 2023-02-16

## 2023-02-16 VITALS
OXYGEN SATURATION: 98 % | DIASTOLIC BLOOD PRESSURE: 78 MMHG | SYSTOLIC BLOOD PRESSURE: 126 MMHG | HEIGHT: 73 IN | WEIGHT: 245 LBS | BODY MASS INDEX: 32.47 KG/M2 | HEART RATE: 80 BPM

## 2023-02-16 DIAGNOSIS — R35.1 BENIGN PROSTATIC HYPERPLASIA WITH NOCTURIA: ICD-10-CM

## 2023-02-16 DIAGNOSIS — R39.9 LOWER URINARY TRACT SYMPTOMS (LUTS): ICD-10-CM

## 2023-02-16 DIAGNOSIS — N40.1 BENIGN PROSTATIC HYPERPLASIA WITH NOCTURIA: ICD-10-CM

## 2023-02-16 PROCEDURE — 51798 US URINE CAPACITY MEASURE: CPT | Performed by: UROLOGY

## 2023-02-16 PROCEDURE — 51702 INSERT TEMP BLADDER CATH: CPT | Performed by: UROLOGY

## 2023-02-16 NOTE — TELEPHONE ENCOUNTER
ALFREDM for pt asking pt to return my call to schedule f/u in 1-2 weeks with Dr. Harley to discuss his episode of urinary retention.

## 2023-02-16 NOTE — PROGRESS NOTES
02/16/23  MB      Goyal catheter removal per order      -yellow clear urine in catheter bag  --filled bladder with 200 mL's of sterile water.  -Withdrew 10 cc of water from balloon      -Catheter removed without difficulty; catheter tip intact.  -pt tolerated well     -provided patient with 8 oz of water to drink  -pt void 100 mL's of urine    -PVR 81     -educated pt on self catheterization.  Patient voiced understanding    -ask pt to contact us if needed, if pt is unable to urinate and is experiencing pain to go to ER.  Patient voiced understanding.    Answers for HPI/ROS submitted by the patient on 2/10/2023  What is the primary reason for your visit?: Other  Please describe your symptoms.: The removal of a catheter and test.  Have you had these symptoms before?: No  How long have you been having these symptoms?: 5-7 days

## 2023-02-16 NOTE — TELEPHONE ENCOUNTER
----- Message from Jaime Harley MD sent at 2/16/2023  8:50 AM EST -----  We need to schedule him to see me in 1-2 weeks for discussion after episode of retention. Thanks  ----- Message -----  From: Sunshine Finney MA  Sent: 2/16/2023   8:40 AM EST  To: Jaime Harley MD    Catheter removal, trial of void went well....documented incounter.

## 2023-02-18 ENCOUNTER — HOSPITAL ENCOUNTER (EMERGENCY)
Facility: HOSPITAL | Age: 68
Discharge: HOME | End: 2023-02-18
Attending: EMERGENCY MEDICINE | Admitting: EMERGENCY MEDICINE
Payer: MEDICARE

## 2023-02-18 VITALS
OXYGEN SATURATION: 98 % | HEIGHT: 73 IN | TEMPERATURE: 98.5 F | RESPIRATION RATE: 18 BRPM | WEIGHT: 245 LBS | SYSTOLIC BLOOD PRESSURE: 176 MMHG | HEART RATE: 86 BPM | BODY MASS INDEX: 32.47 KG/M2 | DIASTOLIC BLOOD PRESSURE: 99 MMHG

## 2023-02-18 DIAGNOSIS — N40.0 ENLARGED PROSTATE: ICD-10-CM

## 2023-02-18 DIAGNOSIS — R33.8 ACUTE URINARY RETENTION: Primary | ICD-10-CM

## 2023-02-18 LAB
BACTERIA UR QL AUTO: ABNORMAL /HPF
BILIRUB UR QL STRIP: NEGATIVE
CLARITY UR: CLEAR
COLOR UR: YELLOW
GLUCOSE UR STRIP-MCNC: NEGATIVE MG/DL
HGB UR QL STRIP.AUTO: NEGATIVE
HYALINE CASTS UR QL AUTO: ABNORMAL /LPF
KETONES UR QL STRIP: NEGATIVE
LEUKOCYTE ESTERASE UR QL STRIP.AUTO: ABNORMAL
NITRITE UR QL STRIP: NEGATIVE
PH UR STRIP.AUTO: 5.5 [PH] (ref 5–8)
PROT UR QL STRIP: NEGATIVE
RBC # UR STRIP: ABNORMAL /HPF
REF LAB TEST METHOD: ABNORMAL
SP GR UR STRIP: 1.02 (ref 1–1.03)
SQUAMOUS #/AREA URNS HPF: ABNORMAL /HPF
UROBILINOGEN UR QL STRIP: ABNORMAL
WBC # UR STRIP: ABNORMAL /HPF

## 2023-02-18 PROCEDURE — 81001 URINALYSIS AUTO W/SCOPE: CPT | Performed by: EMERGENCY MEDICINE

## 2023-02-18 PROCEDURE — 99283 EMERGENCY DEPT VISIT LOW MDM: CPT

## 2023-02-19 NOTE — ED PROVIDER NOTES
Subjective   History of Present Illness  67-year-old male presents for evaluation of urinary retention.  The patient states that he was seen here in the emergency department just over a week ago for the same.  He followed up with urology.  He was seen in Dr. Burrell office 2 days ago at which time his Goyal catheter was pulled.  However, he states that for the past 24 to 48 hours he has had repeat issues with urinary retention.  He has had very little urine output and notes that he is experiencing lower abdominal pain and fullness as a result.  He has a known enlarged prostate.  He denies any accompanying dysuria.  No fevers.  As a result of his difficulty urinating, he returns to the emergency department tonight seeking relief.  He currently rates his pain at 5 out of 10 in severity.        Review of Systems   Gastrointestinal: Positive for abdominal pain.   Genitourinary: Positive for difficulty urinating.   All other systems reviewed and are negative.      Past Medical History:   Diagnosis Date   • Allergic    • Benign prostatic hyperplasia About 2001   • Erectile dysfunction About 1998 to 2000       No Known Allergies    Past Surgical History:   Procedure Laterality Date   • COLONOSCOPY  Spring of 2021   • INNER EAR SURGERY         Family History   Problem Relation Age of Onset   • Diabetes Mother    • Cancer Father    • Prostate cancer Father    • Prostate cancer Brother    • Cancer Brother         Prostate   • Prostate cancer Brother    • Diabetes Brother    • Skin cancer Brother    • No Known Problems Maternal Grandmother    • No Known Problems Maternal Grandfather    • No Known Problems Paternal Grandmother    • No Known Problems Paternal Grandfather    • No Known Problems Son    • No Known Problems Son    • No Known Problems Son    • Cancer Brother         Prostate and gastro cancer       Social History     Socioeconomic History   • Marital status:    Tobacco Use   • Smoking status: Never     Passive  exposure: Never   • Smokeless tobacco: Never   Vaping Use   • Vaping Use: Never used   Substance and Sexual Activity   • Alcohol use: Yes     Alcohol/week: 1.0 standard drink     Types: 1 Glasses of wine per week     Comment: every now and then    • Drug use: Never   • Sexual activity: Yes     Partners: Female     Birth control/protection: None           Objective   Physical Exam  Vitals and nursing note reviewed.   Constitutional:       General: He is not in acute distress.     Appearance: He is well-developed. He is not diaphoretic.      Comments: Nontoxic-appearing male   HENT:      Head: Normocephalic and atraumatic.   Neck:      Vascular: No JVD.   Cardiovascular:      Rate and Rhythm: Normal rate and regular rhythm.      Heart sounds: Normal heart sounds. No murmur heard.    No friction rub. No gallop.   Pulmonary:      Effort: Pulmonary effort is normal. No respiratory distress.      Breath sounds: Normal breath sounds. No wheezing or rales.   Abdominal:      General: Bowel sounds are normal. There is no distension.      Palpations: Abdomen is soft. There is no mass.      Tenderness: There is abdominal tenderness. There is no guarding.      Comments: Mild suprapubic abdominal tenderness, no peritoneal signs, no pain out of proportion to exam   Musculoskeletal:         General: Normal range of motion.   Skin:     General: Skin is warm and dry.      Coloration: Skin is not pale.      Findings: No erythema or rash.   Neurological:      Mental Status: He is alert and oriented to person, place, and time.   Psychiatric:         Thought Content: Thought content normal.         Judgment: Judgment normal.         Procedures           ED Course  ED Course as of 02/19/23 0110   Sat Feb 18, 2023   5365 67-year-old male presents for evaluation of urinary retention.  The patient states that he was seen here in the emergency department just over a week ago for the same.  He followed up with urology, Dr. Harley, 2 days ago at  which time his Goyal catheter was pulled.  He notes that over the past 24 to 48 hours he has had repeat urinary retention issues, prompting his visit to the ED tonight.  On arrival, he is nontoxic-appearing.  No CVA tenderness noted.  No fevers or systemic symptoms.  He has suprapubic fullness on abdominal exam.  Bedside ultrasound revealed a very distended urinary bladder with an enlarged prostate.  We will anchor an indwelling Goyal catheter. [DD]   2243 UA is clean.  No evidence of infection. [DD]   2307 The patient got significant relief with his Goyal catheter placement.  He put out approximately 1500 cc of urine.  We will discharge him home with a leg bag and indwelling Goyal catheter.  He will follow-up with Dr. Harley or Palak within the next week.  Agreeable with plan and given appropriate strict return precautions. [DD]      ED Course User Index  [DD] Odell Gabriel MD                                       Recent Results (from the past 24 hour(s))   Urinalysis With Culture If Indicated - Urine, Catheter    Collection Time: 02/18/23  9:48 PM    Specimen: Urine, Catheter   Result Value Ref Range    Color, UA Yellow Yellow, Straw    Appearance, UA Clear Clear    pH, UA 5.5 5.0 - 8.0    Specific Gravity, UA 1.021 1.001 - 1.030    Glucose, UA Negative Negative    Ketones, UA Negative Negative    Bilirubin, UA Negative Negative    Blood, UA Negative Negative    Protein, UA Negative Negative    Leuk Esterase, UA Trace (A) Negative    Nitrite, UA Negative Negative    Urobilinogen, UA 0.2 E.U./dL 0.2 - 1.0 E.U./dL   Urinalysis, Microscopic Only - Urine, Catheter    Collection Time: 02/18/23  9:48 PM    Specimen: Urine, Catheter   Result Value Ref Range    RBC, UA 0-2 None Seen, 0-2 /HPF    WBC, UA 3-5 (A) None Seen, 0-2 /HPF    Bacteria, UA None Seen None Seen, Trace /HPF    Squamous Epithelial Cells, UA None Seen None Seen, 0-2 /HPF    Hyaline Casts, UA 0-6 0 - 6 /LPF    Methodology Manual Light Microscopy  "     Note: In addition to lab results from this visit, the labs listed above may include labs taken at another facility or during a different encounter within the last 24 hours. Please correlate lab times with ED admission and discharge times for further clarification of the services performed during this visit.    No orders to display     Vitals:    02/18/23 2136   BP: 176/99   BP Location: Left arm   Patient Position: Sitting   Pulse: 86   Resp: 18   Temp: 98.5 °F (36.9 °C)   TempSrc: Oral   SpO2: 98%   Weight: 111 kg (245 lb)   Height: 185.4 cm (73\")     Medications - No data to display  ECG/EMG Results (last 24 hours)     ** No results found for the last 24 hours. **        No orders to display              MDM    Final diagnoses:   Acute urinary retention   Enlarged prostate       ED Disposition  ED Disposition     ED Disposition   Discharge    Condition   Stable    Comment   --             Jaime Harley MD  [address] [facility]  [address]  [place] [id number] [phone]    In 1 week      Emeterio Cid Jr., MD  [address] [facility] [address] [facility]  [address]  [place] [id number] [phone]    In 1 week           Medication List      No changes were made to your prescriptions during this visit.          Odlel Gabriel MD  02/19/23 0112    "

## 2023-02-21 ENCOUNTER — OFFICE VISIT (OUTPATIENT)
Dept: UROLOGY | Facility: CLINIC | Age: 68
End: 2023-02-21
Payer: MEDICARE

## 2023-02-21 VITALS — WEIGHT: 245 LBS | OXYGEN SATURATION: 98 % | HEIGHT: 73 IN | HEART RATE: 89 BPM | BODY MASS INDEX: 32.47 KG/M2

## 2023-02-21 DIAGNOSIS — R39.9 LOWER URINARY TRACT SYMPTOMS (LUTS): Primary | ICD-10-CM

## 2023-02-21 PROCEDURE — 99214 OFFICE O/P EST MOD 30 MIN: CPT | Performed by: UROLOGY

## 2023-02-21 NOTE — PROGRESS NOTES
Follow Up Office Visit      Patient Name: Angel Avelar  : 1955   MRN: [id number]     Chief Complaint:    Chief Complaint   Patient presents with   • lower urinary tract symptoms       History of Present Illness: Angel Avelar is a 67 y.o. male who presents today for follow up of for evaluation after recent acute urinary retention.  He has a history of elevated PSA, has been monitored for mild baseline lower urinary tract symptoms.  Presented to [facility] ED 2/10/2023 with acute urinary retention.  Goyal catheter was placed, with seen in urology office for voiding trial and catheter removal 1 week following.  The patient was unable to urinate, had a catheter replaced.  He presents today.  Denies dysuria or symptoms related to Goyal catheter.      Subjective      Review of System: Review of Systems   Genitourinary: Negative for decreased urine volume, difficulty urinating, dysuria, enuresis, flank pain, frequency, hematuria and urgency.      I have reviewed the ROS documented by my clinical staff, updated as appropriate and I agree. Jaime Harley MD    I have reviewed and the following portions of the patient's history were updated as appropriate: past family history, past medical history, past social history, past surgical history and problem list.    Medications:     Current Outpatient Medications:   •  Ascorbic Acid (VITAMIN C PO), Vitamin C, Disp: , Rfl:   •  Cetirizine HCl (Allergy Relief) 10 MG capsule, Take  by mouth., Disp: , Rfl:   •  diphenhydrAMINE (BENADRYL) 25 mg capsule, Take 25 mg by mouth Every 6 (Six) Hours As Needed for Itching., Disp: , Rfl:   •  fluticasone (FLONASE) 50 MCG/ACT nasal spray, 2 sprays into the nostril(s) as directed by provider Daily., Disp: , Rfl:   •  multivitamin with minerals tablet tablet, Take 1 tablet by mouth Daily., Disp: , Rfl:   •  tadalafil (Cialis) 20 MG tablet, Take 1 tablet by mouth Daily As Needed for Erectile Dysfunction., Disp: 30 tablet, Rfl: 5  •   "tamsulosin (FLOMAX) 0.4 MG capsule 24 hr capsule, Take 1 capsule by mouth Daily., Disp: 90 capsule, Rfl: 3  •  VITAMIN E PO, vitamin E, Disp: , Rfl:     Allergies:   No Known Allergies      Objective     Physical Exam:   Vital Signs:   Vitals:    02/21/23 0958   Pulse: 89   SpO2: 98%   Weight: 111 kg (245 lb)   Height: 185.4 cm (72.99\")   PainSc: 0-No pain     Body mass index is 32.33 kg/m².     Physical Exam  Vitals and nursing note reviewed.   Constitutional:       Appearance: Normal appearance.   HENT:      Head: Normocephalic and atraumatic.   Cardiovascular:      Comments: Well perfused  Pulmonary:      Effort: Pulmonary effort is normal.   Abdominal:      General: Abdomen is flat.      Palpations: Abdomen is soft.   Musculoskeletal:         General: Normal range of motion.   Skin:     General: Skin is warm and dry.   Neurological:      General: No focal deficit present.      Mental Status: He is alert and oriented to person, place, and time. Mental status is at baseline.   Psychiatric:         Mood and Affect: Mood normal.         Behavior: Behavior normal.         Thought Content: Thought content normal.         Judgment: Judgment normal.           Labs:   Brief Urine Lab Results  (Last result in the past 365 days)      Color   Clarity   Blood   Leuk Est   Nitrite   Protein   CREAT   Urine HCG        02/18/23 2148 Yellow   Clear   Negative   Trace   Negative   Negative                      Lab Results   Component Value Date    GLUCOSE 123 (H) 02/10/2023    CALCIUM 9.1 02/10/2023     02/10/2023    K 4.0 02/10/2023    CO2 24.0 02/10/2023     02/10/2023    BUN 17 02/10/2023    CREATININE 0.86 02/10/2023    EGFRIFNONA 46 (L) 08/09/2021    BCR 19.8 02/10/2023    ANIONGAP 11.0 02/10/2023       Lab Results   Component Value Date    WBC 6.45 02/10/2023    HGB 12.1 (L) 02/10/2023    HCT 36.8 (L) 02/10/2023    MCV 84.4 02/10/2023     02/10/2023       Images:   No Images in the past 120 days " found..    Measures:   Tobacco:   Angel Avelar  reports that he has never smoked. He has never been exposed to tobacco smoke. He has never used smokeless tobacco.. I have educated him on the risk of diseases from using tobacco products .      Assessment / Plan      Assessment/Plan:   67 y.o. male is seen today for follow up for continued management of acute urinary retention.  Patient developed acute urinary retention 2/10/2023, underwent successful voiding trial on 2/16/2023 but ultimately had difficulty with urination, redevelopment of retention and presented to [facility] ED 2/18/2023 for catheter replacement.  He has been on alpha-blocker for lower urinary tract symptoms, at time of voiding trial he presents today in office.  Given his multiple episodes of retention we have discussed the indication for further anatomic work-up with cystoscopy and TRUS for volume.  We have discussed consideration for possible bladder outlet procedure given multiple episodes of acute urinary retention.  He will follow-up in 1 week for cystoscopy and TRUS, anatomic evaluation with further management pending the studies.  He is understanding agreeable plan of care..     Diagnoses and all orders for this visit:    1. Lower urinary tract symptoms (LUTS) (Primary)         Follow Up:   Return in about 1 week (around 2/28/2023) for Recheck.     I spent approximately 30 minutes providing clinical care for this patient; including review of patient's chart and provider documentation, face to face time spent with patient in examination room (obtaining history, performing physical exam, discussing diagnosis and management options), placing orders, and completing patient documentation.     Jaime Harley MD  [facility]

## 2023-03-01 ENCOUNTER — OFFICE VISIT (OUTPATIENT)
Dept: UROLOGY | Facility: CLINIC | Age: 68
End: 2023-03-01
Payer: MEDICARE

## 2023-03-01 ENCOUNTER — CLINICAL SUPPORT (OUTPATIENT)
Dept: UROLOGY | Facility: CLINIC | Age: 68
End: 2023-03-01
Payer: MEDICARE

## 2023-03-01 ENCOUNTER — TELEPHONE (OUTPATIENT)
Dept: UROLOGY | Facility: CLINIC | Age: 68
End: 2023-03-01

## 2023-03-01 VITALS — BODY MASS INDEX: 32.47 KG/M2 | HEART RATE: 82 BPM | OXYGEN SATURATION: 98 % | WEIGHT: 245 LBS | HEIGHT: 73 IN

## 2023-03-01 DIAGNOSIS — N40.1 BENIGN PROSTATIC HYPERPLASIA WITH LOWER URINARY TRACT SYMPTOMS, SYMPTOM DETAILS UNSPECIFIED: Primary | ICD-10-CM

## 2023-03-01 DIAGNOSIS — R33.9 URINARY RETENTION WITH INCOMPLETE BLADDER EMPTYING: ICD-10-CM

## 2023-03-01 PROCEDURE — 76872 US TRANSRECTAL: CPT | Performed by: UROLOGY

## 2023-03-01 PROCEDURE — 52000 CYSTOURETHROSCOPY: CPT | Performed by: UROLOGY

## 2023-03-01 PROCEDURE — 99215 OFFICE O/P EST HI 40 MIN: CPT | Performed by: UROLOGY

## 2023-03-01 PROCEDURE — 51798 US URINE CAPACITY MEASURE: CPT | Performed by: UROLOGY

## 2023-03-01 NOTE — TELEPHONE ENCOUNTER
The patient called and stated that he was here this morning and had his catheter removed and was told to call back if he was not urinating well.  He said that he has urinated some but nearly as much as he has drank.  States that he feels the same as he did before when he went into retention and wants to come and have the catheter put back in.  I told him to come in and we will bladder scan him and replace the catheter.

## 2023-03-02 ENCOUNTER — TELEPHONE (OUTPATIENT)
Dept: UROLOGY | Facility: CLINIC | Age: 68
End: 2023-03-02
Payer: MEDICARE

## 2023-03-02 NOTE — PROGRESS NOTES
Patient came in on 03/01/2023 at 4:00pm to put a betancourt catheter back in. He laid down on the bed while I scanned his bladder with it having 653mL in it. I put the 16 catheter in and put the sterile water into the balloon , I then let the urine drain into a measuring cup to see how much urine was coming out. I drained 500mL and then I attached the bag to the catheter and it started to drain more. I attached the bag to the leg and the patient left afterwards. I stated if he had any trouble we open at 7:30am and he could call anytime.

## 2023-03-02 NOTE — TELEPHONE ENCOUNTER
I called the patient to check on him after getting a catheter put in yesterday and to make sure he hasn't has any other issues and his catheter is draining. I let a voicemail for him to call me back!

## 2023-03-04 ENCOUNTER — PREP FOR SURGERY (OUTPATIENT)
Dept: OTHER | Facility: HOSPITAL | Age: 68
End: 2023-03-04
Payer: MEDICARE

## 2023-03-04 DIAGNOSIS — N40.1 BENIGN LOCALIZED PROSTATIC HYPERPLASIA WITH LOWER URINARY TRACT SYMPTOMS (LUTS): ICD-10-CM

## 2023-03-04 DIAGNOSIS — R39.9 LOWER URINARY TRACT SYMPTOMS (LUTS): Primary | ICD-10-CM

## 2023-03-04 RX ORDER — SCOLOPAMINE TRANSDERMAL SYSTEM 1 MG/1
1 PATCH, EXTENDED RELEASE TRANSDERMAL CONTINUOUS
Status: CANCELLED | OUTPATIENT
Start: 2023-03-04 | End: 2023-03-07

## 2023-03-04 RX ORDER — ACETAMINOPHEN 500 MG
1000 TABLET ORAL ONCE
Status: CANCELLED | OUTPATIENT
Start: 2023-03-04 | End: 2023-03-04

## 2023-03-04 RX ORDER — GABAPENTIN 300 MG/1
600 CAPSULE ORAL ONCE
Status: CANCELLED | OUTPATIENT
Start: 2023-03-04 | End: 2023-03-04

## 2023-03-04 RX ORDER — CEFAZOLIN SODIUM 2 G/100ML
2 INJECTION, SOLUTION INTRAVENOUS ONCE
Status: CANCELLED | OUTPATIENT
Start: 2023-03-04 | End: 2023-03-04

## 2023-03-04 NOTE — PROGRESS NOTES
"     Follow Up Office Visit      Patient Name: Angel Avelar  : 1955   MRN: [id number]     Chief Complaint:  LUTS    History of Present Illness: Angel Avelar is a 67 y.o. male who presents today for follow up for continued valuation of lower urinary tract symptoms and recent episode of acute urinary retention.  He presents today for cystoscopy and transrectal ultrasound prostate for volume.  Catheter remains indwelling.    Subjective      Review of System: Review of Systems   Genitourinary: Negative for decreased urine volume, difficulty urinating, dysuria, enuresis, flank pain, frequency, hematuria and urgency.      I have reviewed the ROS documented by my clinical staff, updated as appropriate and I agree. Jaime Harley MD    I have reviewed and the following portions of the patient's history were updated as appropriate: past family history, past medical history, past social history, past surgical history and problem list.    Medications:     Current Outpatient Medications:   •  Ascorbic Acid (VITAMIN C PO), Vitamin C, Disp: , Rfl:   •  Cetirizine HCl (Allergy Relief) 10 MG capsule, Take  by mouth., Disp: , Rfl:   •  diphenhydrAMINE (BENADRYL) 25 mg capsule, Take 1 capsule by mouth Every 6 (Six) Hours As Needed for Itching., Disp: , Rfl:   •  fluticasone (FLONASE) 50 MCG/ACT nasal spray, 2 sprays into the nostril(s) as directed by provider Daily., Disp: , Rfl:   •  multivitamin with minerals tablet tablet, Take 1 tablet by mouth Daily., Disp: , Rfl:   •  tadalafil (Cialis) 20 MG tablet, Take 1 tablet by mouth Daily As Needed for Erectile Dysfunction., Disp: 30 tablet, Rfl: 5  •  tamsulosin (FLOMAX) 0.4 MG capsule 24 hr capsule, Take 1 capsule by mouth Daily., Disp: 90 capsule, Rfl: 3    Allergies:   No Known Allergies        Objective     Physical Exam:   Vital Signs:   Vitals:    23 1029   Pulse: 82   SpO2: 98%   Weight: 111 kg (245 lb)   Height: 185.4 cm (72.99\")   PainSc: 0-No pain     Body mass " index is 32.33 kg/m².     Physical Exam  Vitals and nursing note reviewed.   Constitutional:       Appearance: Normal appearance.   HENT:      Head: Normocephalic and atraumatic.   Cardiovascular:      Comments: Well perfused  Pulmonary:      Effort: Pulmonary effort is normal.   Abdominal:      General: Abdomen is flat.      Palpations: Abdomen is soft.   Musculoskeletal:         General: Normal range of motion.   Skin:     General: Skin is warm and dry.   Neurological:      General: No focal deficit present.      Mental Status: He is alert and oriented to person, place, and time. Mental status is at baseline.   Psychiatric:         Mood and Affect: Mood normal.         Behavior: Behavior normal.         Thought Content: Thought content normal.         Judgment: Judgment normal.         Genitourinary  Penis: circumcised penis, glans normal, no penile discharge.  No rashes/lesions.    Testes: descended bilaterally, no masses, nontender to palpation. Remainder of scrotal contents normal. No hernia appreciated.  Rectal:  Normal tone, no masses.  Prostate:  45 grams.  Symmetric, non-tender, anodular and no induration.      Labs:   Brief Urine Lab Results  (Last result in the past 365 days)      Color   Clarity   Blood   Leuk Est   Nitrite   Protein   CREAT   Urine HCG        02/18/23 2148 Yellow   Clear   Negative   Trace   Negative   Negative                      Lab Results   Component Value Date    GLUCOSE 123 (H) 02/10/2023    CALCIUM 9.1 02/10/2023     02/10/2023    K 4.0 02/10/2023    CO2 24.0 02/10/2023     02/10/2023    BUN 17 02/10/2023    CREATININE 0.86 02/10/2023    EGFRIFNONA 46 (L) 08/09/2021    BCR 19.8 02/10/2023    ANIONGAP 11.0 02/10/2023       Lab Results   Component Value Date    WBC 6.45 02/10/2023    HGB 12.1 (L) 02/10/2023    HCT 36.8 (L) 02/10/2023    MCV 84.4 02/10/2023     02/10/2023       Images:   No Images in the past 120 days found..    Measures:   Tobacco:   Angel PATEL  Valentino  reports that he has never smoked. He has never been exposed to tobacco smoke. He has never used smokeless tobacco.. I have educated him on the risk of diseases from using tobacco products.      Preprocedure diagnosis:  Lower urinary tract symptoms    Postprocedure diagnosis:  Urinary tract symptoms    Procedure:  Diagnostic Cystourethroscopy  Transrectal Ultrasound of the Prostate for Volume    Attending surgeon:  Jaime Harley MD    Anesthesia:  2% lidocaine jelly intraurethrally    Complications:  None    Indications:  67 y.o. male undergoing a diagnostic cystoscopy and transrectal ultrasound of the prostate for volume for the above mentioned indications.  Informed consent was obtained.      Findings:  Cystoscopic findings normal pendulous, bulbar, membranous urethra.  Within the prostatic urethra there was lateral lobe coaptation, with  median lobe.   Within the urinary bladder formal cystoscopy was performed with normal bladder mucosa and no tumors, masses or stones. Right and left ureteral orifice were identified and in the normal orthotopic position.     The dimensions of the prostate were measured: for a calculated volume of 90 g.      Procedure:  The patient was placed in supine position and prepped and draped in sterile fashion with lidocaine jelly instill per the urethra for anesthesia 5 minutes prior to procedure start.  A brief timeout was performed including available nursing staff and the patient.  The 16 Fr digital flexible cystoscope was lubricated and gently advanced into the urethral meatus. The penile, bulbar, and membranous urethra appeared widely patent without obvious stricture or mucosal abnormality. Within the prostatic urethra there was lateral lobe coaptation, with  median lobe. The cytoscope was then advanced into the bladder. The bladder was completely visualized starting with the trigone. There were bilateral orthotopic ureteral orifices. The posterior wall, lateral walls,  anterior wall, and dome were completely visualized WITHOUT obvious mucosal lesions, tumors, stones, or trabeculations.  The cystoscope was retroflexed and the bladder neck and prostate were further visualized. . The cystoscope was then gently withdrawn while visualizing the urethra and the procedure was then terminated.  The patient tolerated the procedure well.      A digital rectal exam was performed.The rectal ultrasound probe was atraumatically introduced into the rectum.  The prostate and seminal vesicles were inspected systematically using axial and sagittal views with the ultrasound. The ultrasound prove was then removed.       Assessment / Plan      Assessment/Plan:   67 y.o. male is seen today for follow up for evaluation of lower urinary tract symptoms and recent episode of acute urinary retention.  Cystoscopy today has demonstrated lateral lobe coaptation and median lobe.  Prostate volume approximately 90 g.  Uroflow has demonstrated obstructive pattern.  We have discussed the consideration for bladder and the procedure given his urinary symptoms and recurrent urinary retention.  We have discussed given prostate size and parameters he is not a candidate for UroLift or minimally invasive procedure.  We have discussed TURP, greenlight TURP, laser TURP.  After our discussion he would like to proceed with greenlight laser TURP.  We have discussed specific risks and benefits of this procedure.  We have discussed risk of infection, bleeding, recurrence of symptoms.  He was able to void after catheter removal today, he will alert if any difficulty with voiding.  We will plan procedure in the next 2 to 3 weeks.    Diagnoses and all orders for this visit:    1. Benign prostatic hyperplasia with lower urinary tract symptoms, symptom details unspecified (Primary)  -     US Prostate          I spent approximately 45 minutes providing clinical care for this patient; including review of patient's chart and provider  documentation, face to face time spent with patient in examination room (obtaining history, performing physical exam, discussing diagnosis and management options), placing orders, and completing patient documentation.     Jaime Harley MD  [facility]

## 2023-03-04 NOTE — H&P (VIEW-ONLY)
"     Follow Up Office Visit      Patient Name: Angel Avelar  : 1955   MRN: [id number]     Chief Complaint:  LUTS    History of Present Illness: Angel Avelar is a 67 y.o. male who presents today for follow up for continued valuation of lower urinary tract symptoms and recent episode of acute urinary retention.  He presents today for cystoscopy and transrectal ultrasound prostate for volume.  Catheter remains indwelling.    Subjective      Review of System: Review of Systems   Genitourinary: Negative for decreased urine volume, difficulty urinating, dysuria, enuresis, flank pain, frequency, hematuria and urgency.      I have reviewed the ROS documented by my clinical staff, updated as appropriate and I agree. Jaime Harley MD    I have reviewed and the following portions of the patient's history were updated as appropriate: past family history, past medical history, past social history, past surgical history and problem list.    Medications:     Current Outpatient Medications:   •  Ascorbic Acid (VITAMIN C PO), Vitamin C, Disp: , Rfl:   •  Cetirizine HCl (Allergy Relief) 10 MG capsule, Take  by mouth., Disp: , Rfl:   •  diphenhydrAMINE (BENADRYL) 25 mg capsule, Take 1 capsule by mouth Every 6 (Six) Hours As Needed for Itching., Disp: , Rfl:   •  fluticasone (FLONASE) 50 MCG/ACT nasal spray, 2 sprays into the nostril(s) as directed by provider Daily., Disp: , Rfl:   •  multivitamin with minerals tablet tablet, Take 1 tablet by mouth Daily., Disp: , Rfl:   •  tadalafil (Cialis) 20 MG tablet, Take 1 tablet by mouth Daily As Needed for Erectile Dysfunction., Disp: 30 tablet, Rfl: 5  •  tamsulosin (FLOMAX) 0.4 MG capsule 24 hr capsule, Take 1 capsule by mouth Daily., Disp: 90 capsule, Rfl: 3    Allergies:   No Known Allergies        Objective     Physical Exam:   Vital Signs:   Vitals:    23 1029   Pulse: 82   SpO2: 98%   Weight: 111 kg (245 lb)   Height: 185.4 cm (72.99\")   PainSc: 0-No pain     Body mass " index is 32.33 kg/m².     Physical Exam  Vitals and nursing note reviewed.   Constitutional:       Appearance: Normal appearance.   HENT:      Head: Normocephalic and atraumatic.   Cardiovascular:      Comments: Well perfused  Pulmonary:      Effort: Pulmonary effort is normal.   Abdominal:      General: Abdomen is flat.      Palpations: Abdomen is soft.   Musculoskeletal:         General: Normal range of motion.   Skin:     General: Skin is warm and dry.   Neurological:      General: No focal deficit present.      Mental Status: He is alert and oriented to person, place, and time. Mental status is at baseline.   Psychiatric:         Mood and Affect: Mood normal.         Behavior: Behavior normal.         Thought Content: Thought content normal.         Judgment: Judgment normal.         Genitourinary  Penis: circumcised penis, glans normal, no penile discharge.  No rashes/lesions.    Testes: descended bilaterally, no masses, nontender to palpation. Remainder of scrotal contents normal. No hernia appreciated.  Rectal:  Normal tone, no masses.  Prostate:  45 grams.  Symmetric, non-tender, anodular and no induration.      Labs:   Brief Urine Lab Results  (Last result in the past 365 days)      Color   Clarity   Blood   Leuk Est   Nitrite   Protein   CREAT   Urine HCG        02/18/23 2148 Yellow   Clear   Negative   Trace   Negative   Negative                      Lab Results   Component Value Date    GLUCOSE 123 (H) 02/10/2023    CALCIUM 9.1 02/10/2023     02/10/2023    K 4.0 02/10/2023    CO2 24.0 02/10/2023     02/10/2023    BUN 17 02/10/2023    CREATININE 0.86 02/10/2023    EGFRIFNONA 46 (L) 08/09/2021    BCR 19.8 02/10/2023    ANIONGAP 11.0 02/10/2023       Lab Results   Component Value Date    WBC 6.45 02/10/2023    HGB 12.1 (L) 02/10/2023    HCT 36.8 (L) 02/10/2023    MCV 84.4 02/10/2023     02/10/2023       Images:   No Images in the past 120 days found..    Measures:   Tobacco:   Angel PATEL  Valentino  reports that he has never smoked. He has never been exposed to tobacco smoke. He has never used smokeless tobacco.. I have educated him on the risk of diseases from using tobacco products.      Preprocedure diagnosis:  Lower urinary tract symptoms    Postprocedure diagnosis:  Urinary tract symptoms    Procedure:  Diagnostic Cystourethroscopy  Transrectal Ultrasound of the Prostate for Volume    Attending surgeon:  Jaime Harley MD    Anesthesia:  2% lidocaine jelly intraurethrally    Complications:  None    Indications:  67 y.o. male undergoing a diagnostic cystoscopy and transrectal ultrasound of the prostate for volume for the above mentioned indications.  Informed consent was obtained.      Findings:  Cystoscopic findings normal pendulous, bulbar, membranous urethra.  Within the prostatic urethra there was lateral lobe coaptation, with  median lobe.   Within the urinary bladder formal cystoscopy was performed with normal bladder mucosa and no tumors, masses or stones. Right and left ureteral orifice were identified and in the normal orthotopic position.     The dimensions of the prostate were measured: for a calculated volume of 90 g.      Procedure:  The patient was placed in supine position and prepped and draped in sterile fashion with lidocaine jelly instill per the urethra for anesthesia 5 minutes prior to procedure start.  A brief timeout was performed including available nursing staff and the patient.  The 16 Fr digital flexible cystoscope was lubricated and gently advanced into the urethral meatus. The penile, bulbar, and membranous urethra appeared widely patent without obvious stricture or mucosal abnormality. Within the prostatic urethra there was lateral lobe coaptation, with  median lobe. The cytoscope was then advanced into the bladder. The bladder was completely visualized starting with the trigone. There were bilateral orthotopic ureteral orifices. The posterior wall, lateral walls,  anterior wall, and dome were completely visualized WITHOUT obvious mucosal lesions, tumors, stones, or trabeculations.  The cystoscope was retroflexed and the bladder neck and prostate were further visualized. . The cystoscope was then gently withdrawn while visualizing the urethra and the procedure was then terminated.  The patient tolerated the procedure well.      A digital rectal exam was performed.The rectal ultrasound probe was atraumatically introduced into the rectum.  The prostate and seminal vesicles were inspected systematically using axial and sagittal views with the ultrasound. The ultrasound prove was then removed.       Assessment / Plan      Assessment/Plan:   67 y.o. male is seen today for follow up for evaluation of lower urinary tract symptoms and recent episode of acute urinary retention.  Cystoscopy today has demonstrated lateral lobe coaptation and median lobe.  Prostate volume approximately 90 g.  Uroflow has demonstrated obstructive pattern.  We have discussed the consideration for bladder and the procedure given his urinary symptoms and recurrent urinary retention.  We have discussed given prostate size and parameters he is not a candidate for UroLift or minimally invasive procedure.  We have discussed TURP, greenlight TURP, laser TURP.  After our discussion he would like to proceed with greenlight laser TURP.  We have discussed specific risks and benefits of this procedure.  We have discussed risk of infection, bleeding, recurrence of symptoms.  He was able to void after catheter removal today, he will alert if any difficulty with voiding.  We will plan procedure in the next 2 to 3 weeks.    Diagnoses and all orders for this visit:    1. Benign prostatic hyperplasia with lower urinary tract symptoms, symptom details unspecified (Primary)  -     US Prostate          I spent approximately 45 minutes providing clinical care for this patient; including review of patient's chart and provider  documentation, face to face time spent with patient in examination room (obtaining history, performing physical exam, discussing diagnosis and management options), placing orders, and completing patient documentation.     Jaime Harley MD  [facility] [place]

## 2023-03-14 ENCOUNTER — TELEPHONE (OUTPATIENT)
Dept: UROLOGY | Facility: CLINIC | Age: 68
End: 2023-03-14
Payer: MEDICARE

## 2023-03-14 ENCOUNTER — PRE-ADMISSION TESTING (OUTPATIENT)
Dept: PREADMISSION TESTING | Facility: HOSPITAL | Age: 68
End: 2023-03-14
Payer: MEDICARE

## 2023-03-14 VITALS — WEIGHT: 252.76 LBS | HEIGHT: 73 IN | BODY MASS INDEX: 33.5 KG/M2

## 2023-03-14 DIAGNOSIS — R39.9 LOWER URINARY TRACT SYMPTOMS (LUTS): ICD-10-CM

## 2023-03-14 LAB
ANION GAP SERPL CALCULATED.3IONS-SCNC: 7 MMOL/L (ref 5–15)
BUN SERPL-MCNC: 17 MG/DL (ref 8–23)
BUN/CREAT SERPL: 17.9 (ref 7–25)
CALCIUM SPEC-SCNC: 9.5 MG/DL (ref 8.6–10.5)
CHLORIDE SERPL-SCNC: 104 MMOL/L (ref 98–107)
CO2 SERPL-SCNC: 30 MMOL/L (ref 22–29)
CREAT SERPL-MCNC: 0.95 MG/DL (ref 0.76–1.27)
DEPRECATED RDW RBC AUTO: 45.9 FL (ref 37–54)
EGFRCR SERPLBLD CKD-EPI 2021: 87.7 ML/MIN/1.73
ERYTHROCYTE [DISTWIDTH] IN BLOOD BY AUTOMATED COUNT: 14.7 % (ref 12.3–15.4)
GLUCOSE SERPL-MCNC: 102 MG/DL (ref 65–99)
HBA1C MFR BLD: 5.6 % (ref 4.8–5.6)
HCT VFR BLD AUTO: 42.2 % (ref 37.5–51)
HGB BLD-MCNC: 13.1 G/DL (ref 13–17.7)
MCH RBC QN AUTO: 26.4 PG (ref 26.6–33)
MCHC RBC AUTO-ENTMCNC: 31 G/DL (ref 31.5–35.7)
MCV RBC AUTO: 85.1 FL (ref 79–97)
PLATELET # BLD AUTO: 298 10*3/MM3 (ref 140–450)
PMV BLD AUTO: 8.9 FL (ref 6–12)
POTASSIUM SERPL-SCNC: 4.4 MMOL/L (ref 3.5–5.2)
RBC # BLD AUTO: 4.96 10*6/MM3 (ref 4.14–5.8)
SODIUM SERPL-SCNC: 141 MMOL/L (ref 136–145)
WBC NRBC COR # BLD: 5.47 10*3/MM3 (ref 3.4–10.8)

## 2023-03-14 PROCEDURE — 36415 COLL VENOUS BLD VENIPUNCTURE: CPT

## 2023-03-14 PROCEDURE — 80048 BASIC METABOLIC PNL TOTAL CA: CPT

## 2023-03-14 PROCEDURE — 85027 COMPLETE CBC AUTOMATED: CPT

## 2023-03-14 PROCEDURE — 83036 HEMOGLOBIN GLYCOSYLATED A1C: CPT

## 2023-03-14 NOTE — TELEPHONE ENCOUNTER
Patient is calling wanting to know if he can give blood prior to his surgery? And he is wanting to know how long will recovery take? Patient is scheduled for surgery 3/17

## 2023-03-14 NOTE — PAT
An arrival time for procedure was not provided during PAT visit. If patient had any questions or concerns about their arrival time, they were instructed to contact their surgeon/physician.  Additionally, if the patient referred to an arrival time that was acquired from their my chart account, patient was encouraged to verify that time with their surgeon/physician. Arrival times are NOT provided in Pre Admission Testing Department.    Patient denies any current skin issues.     Patient instructed to drink 20 ounces of Gatorade and it needs to be completed 1 hour (for Main OR patients) or 2 hours (scheduled  section & [facility] patients) before given arrival time for procedure (NO RED Gatorade)    Patient verbalized understanding.    CLEARANCE FOR SURGERY ON CHART FROM SAM DUMONT PA-C FROM 23.

## 2023-03-15 NOTE — TELEPHONE ENCOUNTER
Patient called asking if he could donate blood before his procedure and I let him know that it was okay to do so. I told him he'd have a catheter in for 72 hours after the procedure and then it will be removed in the clinic, he stated understanding. He asked about the clear liquid diet for dinner the night before and I gave him a list of foods he could have. I informed him he would have limit physical activity and lifting for 14 days. He stated understanding. He asked about the time of the procedure and I told him they would call him the day before his procedure with a time and any additional information.

## 2023-03-16 ENCOUNTER — ANESTHESIA EVENT (OUTPATIENT)
Dept: PERIOP | Facility: HOSPITAL | Age: 68
End: 2023-03-16
Payer: MEDICARE

## 2023-03-16 RX ORDER — FAMOTIDINE 10 MG/ML
20 INJECTION, SOLUTION INTRAVENOUS ONCE
Status: CANCELLED | OUTPATIENT
Start: 2023-03-16 | End: 2023-03-16

## 2023-03-16 RX ORDER — SODIUM CHLORIDE 0.9 % (FLUSH) 0.9 %
10 SYRINGE (ML) INJECTION EVERY 12 HOURS SCHEDULED
Status: CANCELLED | OUTPATIENT
Start: 2023-03-16

## 2023-03-16 RX ORDER — SODIUM CHLORIDE 0.9 % (FLUSH) 0.9 %
10 SYRINGE (ML) INJECTION AS NEEDED
Status: CANCELLED | OUTPATIENT
Start: 2023-03-16

## 2023-03-16 RX ORDER — SODIUM CHLORIDE 9 MG/ML
40 INJECTION, SOLUTION INTRAVENOUS AS NEEDED
Status: CANCELLED | OUTPATIENT
Start: 2023-03-16

## 2023-03-17 ENCOUNTER — HOSPITAL ENCOUNTER (OUTPATIENT)
Facility: HOSPITAL | Age: 68
Setting detail: HOSPITAL OUTPATIENT SURGERY
Discharge: HOME | End: 2023-03-17
Attending: UROLOGY | Admitting: UROLOGY
Payer: MEDICARE

## 2023-03-17 ENCOUNTER — ANESTHESIA (OUTPATIENT)
Dept: PERIOP | Facility: HOSPITAL | Age: 68
End: 2023-03-17
Payer: MEDICARE

## 2023-03-17 VITALS
WEIGHT: 252 LBS | OXYGEN SATURATION: 98 % | TEMPERATURE: 97 F | HEART RATE: 74 BPM | SYSTOLIC BLOOD PRESSURE: 155 MMHG | BODY MASS INDEX: 33.4 KG/M2 | HEIGHT: 73 IN | RESPIRATION RATE: 16 BRPM | DIASTOLIC BLOOD PRESSURE: 78 MMHG

## 2023-03-17 DIAGNOSIS — R35.1 BENIGN PROSTATIC HYPERPLASIA WITH NOCTURIA: Primary | ICD-10-CM

## 2023-03-17 DIAGNOSIS — N40.1 BENIGN PROSTATIC HYPERPLASIA WITH NOCTURIA: Primary | ICD-10-CM

## 2023-03-17 DIAGNOSIS — R39.9 LOWER URINARY TRACT SYMPTOMS (LUTS): ICD-10-CM

## 2023-03-17 PROCEDURE — 25010000002 FENTANYL CITRATE (PF) 100 MCG/2ML SOLUTION: Performed by: NURSE ANESTHETIST, CERTIFIED REGISTERED

## 2023-03-17 PROCEDURE — 52648 LASER SURGERY OF PROSTATE: CPT | Performed by: UROLOGY

## 2023-03-17 PROCEDURE — 25010000002 PROPOFOL 10 MG/ML EMULSION: Performed by: NURSE ANESTHETIST, CERTIFIED REGISTERED

## 2023-03-17 PROCEDURE — 25010000002 DEXAMETHASONE PER 1 MG: Performed by: NURSE ANESTHETIST, CERTIFIED REGISTERED

## 2023-03-17 PROCEDURE — 25010000002 CEFAZOLIN IN DEXTROSE 2-4 GM/100ML-% SOLUTION: Performed by: UROLOGY

## 2023-03-17 PROCEDURE — 25010000002 NEOSTIGMINE 10 MG/10ML SOLUTION: Performed by: NURSE ANESTHETIST, CERTIFIED REGISTERED

## 2023-03-17 PROCEDURE — 25010000002 ONDANSETRON PER 1 MG: Performed by: NURSE ANESTHETIST, CERTIFIED REGISTERED

## 2023-03-17 RX ORDER — EPHEDRINE SULFATE 50 MG/ML
INJECTION INTRAVENOUS AS NEEDED
Status: DISCONTINUED | OUTPATIENT
Start: 2023-03-17 | End: 2023-03-17 | Stop reason: SURG

## 2023-03-17 RX ORDER — MIDAZOLAM HYDROCHLORIDE 1 MG/ML
0.5 INJECTION INTRAMUSCULAR; INTRAVENOUS
Status: DISCONTINUED | OUTPATIENT
Start: 2023-03-17 | End: 2023-03-17 | Stop reason: HOSPADM

## 2023-03-17 RX ORDER — LIDOCAINE HYDROCHLORIDE 10 MG/ML
INJECTION, SOLUTION EPIDURAL; INFILTRATION; INTRACAUDAL; PERINEURAL AS NEEDED
Status: DISCONTINUED | OUTPATIENT
Start: 2023-03-17 | End: 2023-03-17 | Stop reason: SURG

## 2023-03-17 RX ORDER — NEOSTIGMINE METHYLSULFATE 1 MG/ML
INJECTION, SOLUTION INTRAVENOUS AS NEEDED
Status: DISCONTINUED | OUTPATIENT
Start: 2023-03-17 | End: 2023-03-17 | Stop reason: SURG

## 2023-03-17 RX ORDER — MAGNESIUM HYDROXIDE 1200 MG/15ML
LIQUID ORAL AS NEEDED
Status: DISCONTINUED | OUTPATIENT
Start: 2023-03-17 | End: 2023-03-17 | Stop reason: HOSPADM

## 2023-03-17 RX ORDER — DEXAMETHASONE SODIUM PHOSPHATE 4 MG/ML
INJECTION, SOLUTION INTRA-ARTICULAR; INTRALESIONAL; INTRAMUSCULAR; INTRAVENOUS; SOFT TISSUE AS NEEDED
Status: DISCONTINUED | OUTPATIENT
Start: 2023-03-17 | End: 2023-03-17 | Stop reason: SURG

## 2023-03-17 RX ORDER — LIDOCAINE HYDROCHLORIDE 20 MG/ML
JELLY TOPICAL AS NEEDED
Status: DISCONTINUED | OUTPATIENT
Start: 2023-03-17 | End: 2023-03-17 | Stop reason: HOSPADM

## 2023-03-17 RX ORDER — ONDANSETRON 2 MG/ML
INJECTION INTRAMUSCULAR; INTRAVENOUS AS NEEDED
Status: DISCONTINUED | OUTPATIENT
Start: 2023-03-17 | End: 2023-03-17 | Stop reason: SURG

## 2023-03-17 RX ORDER — ROCURONIUM BROMIDE 10 MG/ML
INJECTION, SOLUTION INTRAVENOUS AS NEEDED
Status: DISCONTINUED | OUTPATIENT
Start: 2023-03-17 | End: 2023-03-17 | Stop reason: SURG

## 2023-03-17 RX ORDER — PROPOFOL 10 MG/ML
VIAL (ML) INTRAVENOUS AS NEEDED
Status: DISCONTINUED | OUTPATIENT
Start: 2023-03-17 | End: 2023-03-17 | Stop reason: SURG

## 2023-03-17 RX ORDER — GABAPENTIN 300 MG/1
600 CAPSULE ORAL ONCE
Status: COMPLETED | OUTPATIENT
Start: 2023-03-17 | End: 2023-03-17

## 2023-03-17 RX ORDER — CEFAZOLIN SODIUM 2 G/100ML
2 INJECTION, SOLUTION INTRAVENOUS ONCE
Status: COMPLETED | OUTPATIENT
Start: 2023-03-17 | End: 2023-03-17

## 2023-03-17 RX ORDER — FAMOTIDINE 20 MG/1
20 TABLET, FILM COATED ORAL ONCE
Status: COMPLETED | OUTPATIENT
Start: 2023-03-17 | End: 2023-03-17

## 2023-03-17 RX ORDER — TAMSULOSIN HYDROCHLORIDE 0.4 MG/1
1 CAPSULE ORAL DAILY
Qty: 14 CAPSULE | Refills: 0 | Status: SHIPPED | OUTPATIENT
Start: 2023-03-17 | End: 2023-03-31

## 2023-03-17 RX ORDER — IPRATROPIUM BROMIDE AND ALBUTEROL SULFATE 2.5; .5 MG/3ML; MG/3ML
3 SOLUTION RESPIRATORY (INHALATION) ONCE AS NEEDED
Status: DISCONTINUED | OUTPATIENT
Start: 2023-03-17 | End: 2023-03-17 | Stop reason: HOSPADM

## 2023-03-17 RX ORDER — ACETAMINOPHEN 500 MG
1000 TABLET ORAL ONCE
Status: COMPLETED | OUTPATIENT
Start: 2023-03-17 | End: 2023-03-17

## 2023-03-17 RX ORDER — FENTANYL CITRATE 50 UG/ML
INJECTION, SOLUTION INTRAMUSCULAR; INTRAVENOUS AS NEEDED
Status: DISCONTINUED | OUTPATIENT
Start: 2023-03-17 | End: 2023-03-17 | Stop reason: SURG

## 2023-03-17 RX ORDER — SODIUM CHLORIDE, SODIUM LACTATE, POTASSIUM CHLORIDE, CALCIUM CHLORIDE 600; 310; 30; 20 MG/100ML; MG/100ML; MG/100ML; MG/100ML
9 INJECTION, SOLUTION INTRAVENOUS CONTINUOUS
Status: DISCONTINUED | OUTPATIENT
Start: 2023-03-17 | End: 2023-03-17 | Stop reason: HOSPADM

## 2023-03-17 RX ORDER — NITROFURANTOIN 25; 75 MG/1; MG/1
100 CAPSULE ORAL 2 TIMES DAILY
Qty: 14 CAPSULE | Refills: 0 | Status: SHIPPED | OUTPATIENT
Start: 2023-03-17

## 2023-03-17 RX ORDER — GLYCOPYRROLATE 0.2 MG/ML
INJECTION INTRAMUSCULAR; INTRAVENOUS AS NEEDED
Status: DISCONTINUED | OUTPATIENT
Start: 2023-03-17 | End: 2023-03-17 | Stop reason: SURG

## 2023-03-17 RX ORDER — LIDOCAINE HYDROCHLORIDE 10 MG/ML
0.5 INJECTION, SOLUTION EPIDURAL; INFILTRATION; INTRACAUDAL; PERINEURAL ONCE AS NEEDED
Status: COMPLETED | OUTPATIENT
Start: 2023-03-17 | End: 2023-03-17

## 2023-03-17 RX ORDER — PHENAZOPYRIDINE HYDROCHLORIDE 200 MG/1
200 TABLET, FILM COATED ORAL 3 TIMES DAILY PRN
Qty: 20 TABLET | Refills: 0 | Status: SHIPPED | OUTPATIENT
Start: 2023-03-17

## 2023-03-17 RX ORDER — ONDANSETRON 2 MG/ML
4 INJECTION INTRAMUSCULAR; INTRAVENOUS ONCE AS NEEDED
Status: DISCONTINUED | OUTPATIENT
Start: 2023-03-17 | End: 2023-03-17 | Stop reason: HOSPADM

## 2023-03-17 RX ADMIN — GABAPENTIN 600 MG: 300 CAPSULE ORAL at 12:18

## 2023-03-17 RX ADMIN — ROCURONIUM BROMIDE 10 MG: 10 INJECTION INTRAVENOUS at 15:10

## 2023-03-17 RX ADMIN — GLYCOPYRROLATE 0.4 MCG: 0.2 INJECTION INTRAMUSCULAR; INTRAVENOUS at 15:51

## 2023-03-17 RX ADMIN — CEFAZOLIN SODIUM 2 G: 2 INJECTION, SOLUTION INTRAVENOUS at 14:35

## 2023-03-17 RX ADMIN — DEXAMETHASONE SODIUM PHOSPHATE 8 MG: 4 INJECTION, SOLUTION INTRAMUSCULAR; INTRAVENOUS at 14:48

## 2023-03-17 RX ADMIN — LIDOCAINE HYDROCHLORIDE 50 MG: 10 INJECTION, SOLUTION EPIDURAL; INFILTRATION; INTRACAUDAL; PERINEURAL at 14:40

## 2023-03-17 RX ADMIN — FAMOTIDINE 20 MG: 20 TABLET ORAL at 12:18

## 2023-03-17 RX ADMIN — PROPOFOL 200 MG: 10 INJECTION, EMULSION INTRAVENOUS at 14:40

## 2023-03-17 RX ADMIN — SODIUM CHLORIDE, POTASSIUM CHLORIDE, SODIUM LACTATE AND CALCIUM CHLORIDE 9 ML/HR: 600; 310; 30; 20 INJECTION, SOLUTION INTRAVENOUS at 12:17

## 2023-03-17 RX ADMIN — ONDANSETRON 4 MG: 2 INJECTION INTRAMUSCULAR; INTRAVENOUS at 14:54

## 2023-03-17 RX ADMIN — FENTANYL CITRATE 50 MCG: 50 INJECTION, SOLUTION INTRAMUSCULAR; INTRAVENOUS at 14:40

## 2023-03-17 RX ADMIN — SODIUM CHLORIDE, POTASSIUM CHLORIDE, SODIUM LACTATE AND CALCIUM CHLORIDE: 600; 310; 30; 20 INJECTION, SOLUTION INTRAVENOUS at 15:37

## 2023-03-17 RX ADMIN — LIDOCAINE HYDROCHLORIDE 0.5 ML: 10 INJECTION, SOLUTION EPIDURAL; INFILTRATION; INTRACAUDAL; PERINEURAL at 12:17

## 2023-03-17 RX ADMIN — ROCURONIUM BROMIDE 35 MG: 10 INJECTION INTRAVENOUS at 14:41

## 2023-03-17 RX ADMIN — ACETAMINOPHEN 1000 MG: 500 TABLET ORAL at 12:19

## 2023-03-17 RX ADMIN — NEOSTIGMINE 3 MG: 1 INJECTION INTRAVENOUS at 15:51

## 2023-03-17 RX ADMIN — FENTANYL CITRATE 50 MCG: 50 INJECTION, SOLUTION INTRAMUSCULAR; INTRAVENOUS at 14:55

## 2023-03-17 RX ADMIN — EPHEDRINE SULFATE 20 MG: 50 INJECTION INTRAVENOUS at 15:29

## 2023-03-17 NOTE — ANESTHESIA POSTPROCEDURE EVALUATION
Patient: Angel Avelar    Procedure Summary     Date: 03/17/23 Room / Location:  [facility] /  [facility]    Anesthesia Start: 1435 Anesthesia Stop: 1603    Procedure: CYSTOSCOPY TRANSURETHRAL RESECTION OF PROSTATE GREENLIGHT Diagnosis:       Lower urinary tract symptoms (LUTS)      (Lower urinary tract symptoms (LUTS) [R39.9])    Surgeons: Jaime Harley MD Provider: Rosa Maria Patel DO    Anesthesia Type: general ASA Status: 2          Anesthesia Type: general    Vitals  Vitals Value Taken Time   /87 03/17/23 1603   Temp 97 °F (36.1 °C) 03/17/23 1603   Pulse 93 03/17/23 1604   Resp 16 03/17/23 1603   SpO2 96 % 03/17/23 1604   Vitals shown include unvalidated device data.        Post Anesthesia Care and Evaluation    Patient location during evaluation: PACU  Patient participation: waiting for patient participation  Level of consciousness: sleepy but conscious    Airway patency: patent  Anesthetic complications: No anesthetic complications  PONV Status: none  Cardiovascular status: stable  Respiratory status: spontaneous ventilation and nasal cannula  Hydration status: acceptable  No anesthesia care post op

## 2023-03-17 NOTE — OP NOTE
CYSTOSCOPY TRANSURETHRAL RESECTION OF PROSTATE GREENLIGHT  Procedure Report    Patient Name:  Angel Avelar  YOB: 1955    Date of Surgery:  3/17/2023     Indications: 67-year-old male presenting for greenlight transurethral laser vaporization of the prostate.  He has a history of lower urinary tract symptoms, recent history of acute urinary retention in the postoperative setting.  He has failed multiple voiding trials.  Given his prostatic anatomy with median lobe and lateral lobe coaptation as well as urinary symptoms and retention we have discussed the indication for surgical intervention the risk benefits and alternatives were discussed the patient elects to proceed    Pre-op Diagnosis:   Lower urinary tract symptoms (LUTS) [R39.9]       Post-Op Diagnosis Codes:     * Lower urinary tract symptoms (LUTS) [R39.9]          Procedure(s):  CYSTOSCOPY  TRANSURETHRAL RESECTION OF PROSTATE -GREENLIGHT VAPORIZATION   HART CATHETER INSERTION    Staff:  Surgeon(s):  Jaime Harley MD      Anesthesia: General    Estimated Blood Loss: none    Implants:    Nothing was implanted during the procedure    Specimen:          None        Findings:   1.  Normal urinary bladder without evidence of tumor stone or foreign body.  Bilateral ureteral orifice ease identified at the beginning and conclusion of case  2.  Lateral lobe coaptation and median lobe tissue.  Laser photo vaporization.  Hemostasis achieved.  3. 22F Hart catheter placed    Complications: None immediate    Description of Procedure:   After informed consent was obtained, the patient was taken to the operating room and placed supine on the operating table and general anesthesia was induced. He was repositioned to the dorsal lithotomy position and prepped and draped in a standard sterile fashion.     Preoperative antibiotics were given and a multidisciplinary time-out was taken. Using George sounds, the urethra was dilated to 30-Pakistani.     Next, a  26-Greek continuous flow resectoscope with a visual obturator was advanced through the urethra and into the bladder. The prostatic urethra was notable for lateral lobe coaptation and median lobe.     Panendoscopy revealed  no bladder abnormalities and both ureteral orifices were identified.      The visual obturator was removed and replaced with a laser bridge. A Greenlight laser fiber was advanced through the working channel of the scope, and two vaporization incisions were made at the 5 and 7 o'clock positions t the bladder neck, and then taken down to just proximal to the verumontanum, sparing the urethral sphincter.    The median lobe was completely ablated/vaporized to the prostatic capsule avoiding undermining the bladder neck.     Next, the right lateral lobe was vaporized to the prostatic capsule, followed by the left. Bleeding vessels and venous channels encountered were controlled with coagulation settings on the laser.     There was no significant prostatic specimen available to send to pathology after completion of vaporization. The bladder was emptied. 20 cc of lidocaine gel was instilled into the urethra for analgesia. . A 22 Fr 3-way betancourt catheter was then inserted into the bladder without difficulty and 30 ml of sterile water was used to fill the balloon.     The patient was brought to PACU in stable condition. The patient tolerated the procedure well and there were no immediate complications.    Disposition: Patient discharge remaining appropriate PACU criteria.  He will maintain Betancourt catheter in place for 72 hours.  He will follow-up for voiding trial and catheter removal in clinic          Jaime Harley MD     Date: 3/17/2023  Time: 16:12 EDT

## 2023-03-17 NOTE — DISCHARGE INSTRUCTIONS
Laser Ablation of Prostate Post-Operative Care/Expectations     Please follow these guidelines after your procedure in order to assist with your recovery.     Anesthesia Precautions and Expectations  - Rest for 24 hours after receiving general anesthesia, make sure you have someone at home with you that can monitor you  - Do not operate a vehicle, drink alcohol, or make 'important decisions'/sign legal documentation during the immediate recovery period if you received sedation for your procedure  - You may experience a sore throat, jaw discomfort, or muscle aches related to anesthesia, these symptoms may last a few days    Activity  - Light activity is encouraged for 1 week to prevent urinary bleeding  - Avoid lifting heavy objects more than 20 lbs, running, or endurance exercise for 1 week   - Do not operate a vehicle or drink alcohol if you were prescribed narcotic pain medications     Bathing/Showering  - You may resume normal bathing and showering post-procedure    Pain/Urinary Symptoms  - You may experience burning urinary pain for a few days, and/or increased urinary urgency/frequency post-procedure for a few weeks which is expected  - A medication to treat burning urinary pain (Phenazopyridine) may be prescribed by your doctor, take as directed  - A medication to prevent urinary urgency/frequency (sometimes referred to as “bladder spasms”) (Hyoscyamine, Oxybutynin, Mirabegron, Solifenacin, etc.) may be prescribed by your doctor for up to 1 month, take as directed     Urinary Bleeding (Hematuria)   - Some degree of light urinary bleeding (hematuria) is expected for up to 1-2 weeks (this may be as light as pink lemonade or somewhat darker like clear/pale red Gatorade); a good rule of thumb is that your urine should remain see-through    - If you experience heavy urinary bleeding (like the color and consistency of tomato juice, or red wine), large blood clots, or you are unable to urinate for more than 8 hours  you should contact your doctor and present to the nearest Emergency Department  - Drink plenty of water at home and stay hydrated, as this will help naturally flush out your bladder and urethra    Sexual Activity  - Refrain from sexual activity and ejaculation for 1 week while you are healing which may help prevent pain and bleeding    Goyal Catheter   - You may be sent home with a urethral catheter sometimes for up to 1 week post-procedure; catheter specific instructions are as follows:   - Do not attempt to remove your urinary catheter (unless explicitly instructed by your doctor with at home catheter removal instructions/equipment)  - If you feel that your catheter is not working the right way, call your doctor   - Keep your skin and catheter clean, clean the skin around your catheter at least two times each day, clean your skin  and catheter after every bowel movement  - Always keep your urine collection bag below the level of your bladder; keeping the bag below your bladder will help keep your urine from flowing back into your bladder from urine collection bag, which may cause infection     - Drink plenty of liquids, at least 6-8 glasses of healthy liquids or water each day which will help flush out your bladder  - Do not attempt sexual intercourse while you have a catheter in place  - Do not tug or pull on your catheter tubing. This can cause you to bleed and hurt your urethra. Do not step on the tubing when you walk. Always keep the catheter secured to your inner leg with either leg-tape provided, the special catheter sticker/securing device, or leg strap            When to Call Your Doctor  - Severe pain not controlled by oral medications  - Temperature above 101 F degrees  - Severe urinary bleeding or large blood clots in urine  - Inability to urinate for more than 8 hours post-surgery

## 2023-03-17 NOTE — ANESTHESIA PROCEDURE NOTES
Airway  Urgency: elective    Date/Time: 3/17/2023 2:42 PM  Airway not difficult    General Information and Staff    Patient location during procedure: OR  CRNA/CAA: Catrina Moore CRNA    Indications and Patient Condition  Indications for airway management: airway protection    Preoxygenated: yes  MILS not maintained throughout  Mask difficulty assessment: 1 - vent by mask    Final Airway Details  Final airway type: endotracheal airway      Successful airway: ETT  Cuffed: yes   Successful intubation technique: video laryngoscopy  Facilitating devices/methods: intubating stylet  Endotracheal tube insertion site: oral  Blade: [place]  Blade size: 4  ETT size (mm): 7.5  Cormack-Lehane Classification: grade I - full view of glottis  Placement verified by: chest auscultation and capnometry   Measured from: lips  ETT/EBT  to lips (cm): 22  Number of attempts at approach: 1  Assessment: lips, teeth, and gum same as pre-op and atraumatic intubation    Additional Comments  Negative epigastric sounds, Breath sound equal bilaterally with symmetric chest rise and fall

## 2023-03-17 NOTE — ANESTHESIA PREPROCEDURE EVALUATION
Anesthesia Evaluation     Patient summary reviewed and Nursing notes reviewed   no history of anesthetic complications:  NPO Solid Status: > 8 hours  NPO Liquid Status: > 2 hours           Airway   Mallampati: II  TM distance: >3 FB  Neck ROM: full  No difficulty expected  Dental    (+) partials    Pulmonary - normal exam   (-) not a smoker  Cardiovascular - normal exam  Exercise tolerance: good (4-7 METS)    (-) dysrhythmias, angina, CHF, cardiac stents      Neuro/Psych  (-) seizures, CVA  GI/Hepatic/Renal/Endo    (+) obesity,       Musculoskeletal     Abdominal    Substance History   (+) alcohol use,   (-) drug use     OB/GYN          Other        ROS/Med Hx Other: BPH                  Anesthesia Plan    ASA 2     general     (Risks and benefits of general anesthesia discussed with patient (including MI, CVA, death, recall, aspiration, oropharyngeal/dental damage), questions answered, agreeable to proceed.    )  intravenous induction     Anesthetic plan, risks, benefits, and alternatives have been provided, discussed and informed consent has been obtained with: patient and spouse/significant other.    Plan discussed with CRNA.        CODE STATUS:

## 2023-03-17 NOTE — INTERVAL H&P NOTE
"Pre-Op H&P (See Recent Office Note Attached for Full H&P)    History and physical note from office reviewed and updated with the following, otherwise there are no changes in H&P:      Review of Systems:  General ROS:  no fever, chills, rashes.  No change since last office visit.  No recent sick exposure  Cardiovascular ROS: no chest pain or dyspnea on exertion  Respiratory ROS: no cough, shortness of breath, or wheezing    Immunization History:   Influenza:  no   Pneumococcal:  yes   Tetanus:  yes     Meds:    No current facility-administered medications on file prior to encounter.     Current Outpatient Medications on File Prior to Encounter   Medication Sig Dispense Refill    Cetirizine HCl (Allergy Relief) 10 MG capsule Take 10 mg by mouth Daily.      fluticasone (FLONASE) 50 MCG/ACT nasal spray 2 sprays into the nostril(s) as directed by provider Daily.      multivitamin with minerals tablet tablet Take 1 tablet by mouth Daily.      tamsulosin (FLOMAX) 0.4 MG capsule 24 hr capsule Take 1 capsule by mouth Daily. 90 capsule 3    tadalafil (Cialis) 20 MG tablet Take 1 tablet by mouth Daily As Needed for Erectile Dysfunction. 30 tablet 5       Vital Signs:  /87 (BP Location: Right arm, Patient Position: Lying)   Pulse 87   Temp 97.2 °F (36.2 °C) (Temporal)   Resp 16   Ht 185.4 cm (73\")   Wt 114 kg (252 lb)   SpO2 100%   BMI 33.25 kg/m²     Physical Exam:    CV:  S1S2 regular rate and rhythm, no murmur               Resp:  Clear to auscultation; respirations regular, even and unlabored    Results Review:     Lab Results   Component Value Date    WBC 5.47 03/14/2023    HGB 13.1 03/14/2023    HCT 42.2 03/14/2023    MCV 85.1 03/14/2023     03/14/2023    NEUTROABS 4.08 02/10/2023    GLUCOSE 102 (H) 03/14/2023    BUN 17 03/14/2023    CREATININE 0.95 03/14/2023    EGFRIFNONA 46 (L) 08/09/2021     03/14/2023    K 4.4 03/14/2023     03/14/2023    CO2 30.0 (H) 03/14/2023    CALCIUM 9.5 " 03/14/2023    ALBUMIN 4.1 02/10/2023    AST 29 02/10/2023    ALT 22 02/10/2023    BILITOT 0.6 02/10/2023   I reviewed the patient's new clinical results.    Cancer Staging (if applicable)  Cancer Patient: __ yes __no __unknown; If yes, clinical stage T:__ N:__M:__, stage group or __N/A    Assessment/Plan:    Benign prostatic hyperplasia with lower urinary tract symptoms  /  CYSTOSCOPY TRANSURETHRAL RESECTION OF PROSTATE MELY Steinberg   3/17/2023   12:12 EDT

## 2023-04-19 DIAGNOSIS — N52.9 ERECTILE DYSFUNCTION, UNSPECIFIED ERECTILE DYSFUNCTION TYPE: ICD-10-CM

## 2023-04-19 RX ORDER — TADALAFIL 20 MG/1
20 TABLET ORAL DAILY PRN
Qty: 30 TABLET | Refills: 5 | Status: SHIPPED | OUTPATIENT
Start: 2023-04-19

## 2023-04-19 NOTE — TELEPHONE ENCOUNTER
Rx Refill Note  Requested Prescriptions     Pending Prescriptions Disp Refills   • tadalafil (Cialis) 20 MG tablet 30 tablet 5     Sig: Take 1 tablet by mouth Daily As Needed for Erectile Dysfunction.      Last office visit with prescribing clinician: 8/17/2022   Last telemedicine visit with prescribing clinician: 8/24/2023   Next office visit with prescribing clinician: 8/24/2023                         Would you like a call back once the refill request has been completed: [] Yes [] No    If the office needs to give you a call back, can they leave a voicemail: [] Yes [] No    Marce Drew MA  04/19/23, 16:23 EDT

## 2023-05-12 ENCOUNTER — TELEPHONE (OUTPATIENT)
Dept: UROLOGY | Facility: CLINIC | Age: 68
End: 2023-05-12
Payer: MEDICARE

## 2023-05-12 NOTE — TELEPHONE ENCOUNTER
Patient requesting refill for Flomax.  Patient would like to know if he still needs to take medication.  If yes, please send refill to pharmacy.    Dr. Harley, please advise.  Thank you.

## 2023-05-15 ENCOUNTER — LAB (OUTPATIENT)
Dept: LAB | Facility: HOSPITAL | Age: 68
End: 2023-05-15
Payer: MEDICARE

## 2023-05-15 DIAGNOSIS — R97.20 ELEVATED PROSTATE SPECIFIC ANTIGEN (PSA): ICD-10-CM

## 2023-05-15 PROCEDURE — 84153 ASSAY OF PSA TOTAL: CPT

## 2023-05-15 PROCEDURE — 36415 COLL VENOUS BLD VENIPUNCTURE: CPT

## 2023-05-15 PROCEDURE — 84154 ASSAY OF PSA FREE: CPT

## 2023-05-17 ENCOUNTER — TELEPHONE (OUTPATIENT)
Dept: FAMILY MEDICINE CLINIC | Facility: CLINIC | Age: 68
End: 2023-05-17

## 2023-05-17 DIAGNOSIS — N40.1 BENIGN PROSTATIC HYPERPLASIA WITH NOCTURIA: ICD-10-CM

## 2023-05-17 DIAGNOSIS — R35.1 BENIGN PROSTATIC HYPERPLASIA WITH NOCTURIA: ICD-10-CM

## 2023-05-17 LAB
PSA FREE MFR SERPL: 17.5 %
PSA FREE SERPL-MCNC: 1.75 NG/ML
PSA SERPL-MCNC: 10 NG/ML (ref 0–4)

## 2023-05-17 RX ORDER — TAMSULOSIN HYDROCHLORIDE 0.4 MG/1
1 CAPSULE ORAL DAILY
Qty: 90 CAPSULE | Refills: 3 | Status: SHIPPED | OUTPATIENT
Start: 2023-05-17

## 2023-05-17 NOTE — TELEPHONE ENCOUNTER
Caller: [facility] - [place], [place] - [address] - [phone] [facility] [phone] FX    Relationship: Pharmacy    Best call back number: [phone]    Requested Prescriptions:   -TAMSULOSIN (FLOMAX) 0.4 MG CAPSULE 24 HR     Pharmacy where request should be sent: [facility] - [place], [place] - [address] - [phone] [facility] [phone] FX     Last office visit with prescribing clinician: 8/17/2022   Last telemedicine visit with prescribing clinician: 5/5/2023   Next office visit with prescribing clinician: 8/24/2023     REFERENCE NUMBER: [id number]    Mohsen Mckeon Rep   05/17/23 09:17 EDT

## 2023-05-30 ENCOUNTER — OFFICE VISIT (OUTPATIENT)
Dept: UROLOGY | Facility: CLINIC | Age: 68
End: 2023-05-30

## 2023-05-30 VITALS — HEIGHT: 73 IN | HEART RATE: 89 BPM | WEIGHT: 252 LBS | BODY MASS INDEX: 33.4 KG/M2 | OXYGEN SATURATION: 98 %

## 2023-05-30 DIAGNOSIS — N40.1 BENIGN PROSTATIC HYPERPLASIA WITH LOWER URINARY TRACT SYMPTOMS, SYMPTOM DETAILS UNSPECIFIED: Primary | ICD-10-CM

## 2023-05-30 DIAGNOSIS — R97.20 ELEVATED PROSTATE SPECIFIC ANTIGEN (PSA): ICD-10-CM

## 2023-05-30 LAB
BILIRUB BLD-MCNC: NEGATIVE MG/DL
CLARITY, POC: CLEAR
COLOR UR: YELLOW
EXPIRATION DATE: ABNORMAL
GLUCOSE UR STRIP-MCNC: NEGATIVE MG/DL
KETONES UR QL: NEGATIVE
LEUKOCYTE EST, POC: ABNORMAL
Lab: ABNORMAL
NITRITE UR-MCNC: NEGATIVE MG/ML
PH UR: 6 [PH] (ref 5–8)
PROT UR STRIP-MCNC: NEGATIVE MG/DL
RBC # UR STRIP: NEGATIVE /UL
SP GR UR: 1.02 (ref 1–1.03)
UROBILINOGEN UR QL: NORMAL

## 2023-05-30 NOTE — PROGRESS NOTES
Follow Up Office Visit      Patient Name: Angel Avelar  : 1955   MRN: [id number]     Chief Complaint:    Chief Complaint   Patient presents with   • Benign Prostatic Hypertrophy     LUTS     History of Present Illness: Angel Avelar is a 68 y.o. male who presents today for follow up after greenlight laser PVP for lower urinary tract symptoms.  He is doing well in the postoperative setting.  Ports improvement in storage and voiding urinary symptoms.  IPSS 1.  Denies dysuria or hematuria.    Subjective      Review of System: Review of Systems   Genitourinary: Negative for decreased urine volume, difficulty urinating, dysuria, enuresis, flank pain, frequency, hematuria and urgency.      I have reviewed the ROS documented by my clinical staff, updated as appropriate and I agree. Jaime Harley MD    I have reviewed and the following portions of the patient's history were updated as appropriate: past family history, past medical history, past social history, past surgical history and problem list.    Medications:     Current Outpatient Medications:   •  Cetirizine HCl (Allergy Relief) 10 MG capsule, Take 10 mg by mouth Daily., Disp: , Rfl:   •  fluticasone (FLONASE) 50 MCG/ACT nasal spray, 2 sprays into the nostril(s) as directed by provider Daily., Disp: , Rfl:   •  multivitamin with minerals tablet tablet, Take 1 tablet by mouth Daily., Disp: , Rfl:   •  phenazopyridine (Pyridium) 200 MG tablet, Take 1 tablet by mouth 3 (Three) Times a Day As Needed for Bladder Spasms., Disp: 20 tablet, Rfl: 0  •  tadalafil (Cialis) 20 MG tablet, Take 1 tablet by mouth Daily As Needed for Erectile Dysfunction., Disp: 30 tablet, Rfl: 5  •  nitrofurantoin, macrocrystal-monohydrate, (Macrobid) 100 MG capsule, Take 1 capsule by mouth 2 (Two) Times a Day., Disp: 14 capsule, Rfl: 0  •  tamsulosin (FLOMAX) 0.4 MG capsule 24 hr capsule, Take 1 capsule by mouth Daily., Disp: 90 capsule, Rfl: 3    Allergies:   No Known  "Allergies    IPSS Questionnaire (AUA-7):  Over the past month…    1)  Incomplete Emptying  How often have you had a sensation of not emptying your bladder?  0 - Not at all   2)  Frequency  How often have you had to urinate less than every two hours? 1 - Less than 1 time in 5   3)  Intermittency  How often have you found you stopped and started again several times when you urinated?  0 - Not at all   4) Urgency  How often have you found it difficult to postpone urination?  1 - Less than 1 time in 5   5) Weak Stream  How often have you had a weak urinary stream?  0 - Not at all   6) Straining  How often have you had to push or strain to begin urination?  0 - Not at all   7) Nocturia  How many times did you typically get up at night to urinate?  0 - None   Total Score:  1       Quality of life due to urinary symptoms:  If you were to spend the rest of your life with your urinary condition the way it is now, how would you feel about that? 1 - pleased   Urine Leakage (Incontinence) 0-No Leakage       Objective     Physical Exam:   Vital Signs:   Vitals:    05/30/23 1530   Pulse: 89   SpO2: 98%   Weight: 114 kg (252 lb)   Height: 185.4 cm (73\")     Body mass index is 33.25 kg/m².     Physical Exam  Vitals and nursing note reviewed.   Constitutional:       Appearance: Normal appearance.   HENT:      Head: Normocephalic and atraumatic.   Cardiovascular:      Comments: Well perfused  Pulmonary:      Effort: Pulmonary effort is normal.   Abdominal:      General: Abdomen is flat.      Palpations: Abdomen is soft.   Musculoskeletal:         General: Normal range of motion.   Skin:     General: Skin is warm and dry.   Neurological:      General: No focal deficit present.      Mental Status: He is alert and oriented to person, place, and time. Mental status is at baseline.   Psychiatric:         Mood and Affect: Mood normal.         Behavior: Behavior normal.         Thought Content: Thought content normal.         Judgment: " Judgment normal.             Labs:   Brief Urine Lab Results  (Last result in the past 365 days)      Color   Clarity   Blood   Leuk Est   Nitrite   Protein   CREAT   Urine HCG        05/30/23 1543 Yellow   Clear   Negative   Trace   Negative   Negative                      Lab Results   Component Value Date    GLUCOSE 102 (H) 03/14/2023    CALCIUM 9.5 03/14/2023     03/14/2023    K 4.4 03/14/2023    CO2 30.0 (H) 03/14/2023     03/14/2023    BUN 17 03/14/2023    CREATININE 0.95 03/14/2023    EGFRIFNONA 46 (L) 08/09/2021    BCR 17.9 03/14/2023    ANIONGAP 7.0 03/14/2023       Lab Results   Component Value Date    WBC 5.47 03/14/2023    HGB 13.1 03/14/2023    HCT 42.2 03/14/2023    MCV 85.1 03/14/2023     03/14/2023       Images:   No Images in the past 120 days found..    Measures:   Tobacco:   Angel Avelar  reports that he has never smoked. He has never been exposed to tobacco smoke. He has never used smokeless tobacco.. I have educated him on the risk of diseases from using tobacco products.   Assessment / Plan      Assessment/Plan:   68 y.o. male is seen today for follow-up after recent greenlight PVP for lower urinary tract symptoms.  He is doing very well in the postoperative setting with significant reduction in symptoms.  Current IPSS 1.  Of note the patient had a PSA lab test obtained in 5/2023, this was shortly after his greenlight procedure.  PSA was 10.  Prior value of 4.3 in 11/2022.  Today we have discussed this value was likely related to recent procedure and instrumentation and likely artifact secondary to this.  We have discussed that he will follow-up in 6 months for repeat symptom check after procedure, we will obtain repeat PSA at that time.  He is understanding agreeable plan of care.    Diagnoses and all orders for this visit:    1. Benign prostatic hyperplasia with lower urinary tract symptoms, symptom details unspecified (Primary)  -     POC Urinalysis Dipstick,  Automated    2. Elevated prostate specific antigen (PSA)  -     PSA Total+% Free (Serial); Future         Follow Up:   Return in about 6 months (around 11/30/2023).     I spent approximately 20 minutes providing clinical care for this patient; including review of patient's chart and provider documentation, face to face time spent with patient in examination room (obtaining history, performing physical exam, discussing diagnosis and management options), placing orders, and completing patient documentation.     Jaime Harley MD  [facility]

## 2023-08-24 ENCOUNTER — OFFICE VISIT (OUTPATIENT)
Dept: FAMILY MEDICINE CLINIC | Facility: CLINIC | Age: 68
End: 2023-08-24
Payer: MEDICARE

## 2023-08-24 VITALS
OXYGEN SATURATION: 98 % | BODY MASS INDEX: 32.31 KG/M2 | HEIGHT: 73 IN | WEIGHT: 243.8 LBS | TEMPERATURE: 97.5 F | DIASTOLIC BLOOD PRESSURE: 82 MMHG | HEART RATE: 68 BPM | SYSTOLIC BLOOD PRESSURE: 128 MMHG

## 2023-08-24 DIAGNOSIS — Z00.00 MEDICARE ANNUAL WELLNESS VISIT, SUBSEQUENT: Primary | ICD-10-CM

## 2023-08-24 PROBLEM — H71.90 CHOLESTEATOMA: Status: ACTIVE | Noted: 2021-04-20

## 2023-08-24 PROCEDURE — G0439 PPPS, SUBSEQ VISIT: HCPCS | Performed by: FAMILY MEDICINE

## 2023-08-24 PROCEDURE — 1159F MED LIST DOCD IN RCRD: CPT | Performed by: FAMILY MEDICINE

## 2023-08-24 PROCEDURE — 1170F FXNL STATUS ASSESSED: CPT | Performed by: FAMILY MEDICINE

## 2023-08-24 PROCEDURE — 1160F RVW MEDS BY RX/DR IN RCRD: CPT | Performed by: FAMILY MEDICINE

## 2023-08-24 NOTE — PROGRESS NOTES
The ABCs of the Annual Wellness Visit  Subsequent Medicare Wellness Visit    Subjective      Angel Avelar is a 68 y.o. male who presents for a Subsequent Medicare Wellness Visit.    The following portions of the patient's history were reviewed and   updated as appropriate: allergies, current medications, past family history, past medical history, past social history, past surgical history, and problem list.    Compared to one year ago, the patient feels his physical   health is better.    Compared to one year ago, the patient feels his mental   health is the same.    Recent Hospitalizations:  He was not admitted to the hospital during the last year.       Current Medical Providers:  Patient Care Team:  True Valladares DO as PCP - General (Family Medicine)  Jaime Harley MD as Consulting Physician (Urology)  Rolando Avery MD as Consulting Physician (Gastroenterology)    Outpatient Medications Prior to Visit   Medication Sig Dispense Refill    Cetirizine HCl (Allergy Relief) 10 MG capsule Take 10 mg by mouth Daily.      cholecalciferol (VITAMIN D3) 1.25 MG (98326 UT) capsule TAKE 1 CAPSULE BY MOUTH EVERY DAY THEN START THE 5000 UNITS DAILY      fluticasone (FLONASE) 50 MCG/ACT nasal spray 2 sprays into the nostril(s) as directed by provider Daily.      multivitamin with minerals tablet tablet Take 1 tablet by mouth Daily.      tadalafil (Cialis) 20 MG tablet Take 1 tablet by mouth Daily As Needed for Erectile Dysfunction. 30 tablet 5    phenazopyridine (Pyridium) 200 MG tablet Take 1 tablet by mouth 3 (Three) Times a Day As Needed for Bladder Spasms. 20 tablet 0     No facility-administered medications prior to visit.       No opioid medication identified on active medication list. I have reviewed chart for other potential  high risk medication/s and harmful drug interactions in the elderly.        Aspirin is not on active medication list.  Aspirin use is not indicated based on review of current medical  "condition/s. Risk of harm outweighs potential benefits.  .    Patient Active Problem List   Diagnosis    Erectile dysfunction    Benign prostatic hyperplasia with nocturia    Screening for colorectal cancer    Lower urinary tract symptoms (LUTS)    Elevated prostate specific antigen (PSA)    Cholesteatoma     Advance Care Planning   Advance Care Planning     Advance Directive is not on file.  ACP discussion was held with the patient during this visit. Patient does not have an advance directive, information provided.     Objective    Vitals:    23 0746   BP: 128/82   Pulse: 68   Temp: 97.5 øF (36.4 øC)   TempSrc: Infrared   SpO2: 98%   Weight: 111 kg (243 lb 12.8 oz)   Height: 185.4 cm (73\")     Estimated body mass index is 32.17 kg/mý as calculated from the following:    Height as of this encounter: 185.4 cm (73\").    Weight as of this encounter: 111 kg (243 lb 12.8 oz).           Does the patient have evidence of cognitive impairment?   No            HEALTH RISK ASSESSMENT    Smoking Status:  Social History     Tobacco Use   Smoking Status Never    Passive exposure: Never   Smokeless Tobacco Never     Alcohol Consumption:  Social History     Substance and Sexual Activity   Alcohol Use Yes    Alcohol/week: 1.0 standard drink    Types: 1 Glasses of wine per week    Comment: every now and then      Fall Risk Screen:    PATRICIA Fall Risk Assessment was completed, and patient is at LOW risk for falls.Assessment completed on:2023    Depression Screenin/24/2023     7:52 AM   PHQ-2/PHQ-9 Depression Screening   Little Interest or Pleasure in Doing Things 0-->not at all   Feeling Down, Depressed or Hopeless 0-->not at all   PHQ-9: Brief Depression Severity Measure Score 0       Health Habits and Functional and Cognitive Screenin/24/2023     7:00 AM   Functional & Cognitive Status   Do you have difficulty preparing food and eating? No   Do you have difficulty bathing yourself, getting dressed or " grooming yourself? No   Do you have difficulty using the toilet? No   Do you have difficulty moving around from place to place? No   Do you have trouble with steps or getting out of a bed or a chair? No   Current Diet Well Balanced Diet   Dental Exam Not up to date   Eye Exam Up to date   Exercise (times per week) 3 times per week   Current Exercises Include Other        Exercise Comment Lifting and working part time, walking at work   Do you need help using the phone?  No   Are you deaf or do you have serious difficulty hearing?  Yes   Do you need help to go to places out of walking distance? No   Do you need help shopping? No   Do you need help preparing meals?  No   Do you need help with housework?  No   Do you need help with laundry? No   Do you need help taking your medications? No   Do you need help managing money? No   Do you ever drive or ride in a car without wearing a seat belt? No   Have you felt unusual stress, anger or loneliness in the last month? No   Who do you live with? Spouse   If you need help, do you have trouble finding someone available to you? No   Have you been bothered in the last four weeks by sexual problems? Yes   Do you have difficulty concentrating, remembering or making decisions? No       Age-appropriate Screening Schedule:  Refer to the list below for future screening recommendations based on patient's age, sex and/or medical conditions. Orders for these recommended tests are listed in the plan section. The patient has been provided with a written plan.    Health Maintenance   Topic Date Due    ZOSTER VACCINE (2 of 2) 10/04/2021    COVID-19 Vaccine (3 - Moderna series) 11/13/2023 (Originally 6/28/2021)    INFLUENZA VACCINE  10/01/2023    ANNUAL WELLNESS VISIT  08/24/2024    COLORECTAL CANCER SCREENING  12/02/2026    TDAP/TD VACCINES (2 - Td or Tdap) 08/09/2031    HEPATITIS C SCREENING  Completed    Pneumococcal Vaccine 65+  Completed                  CMS Preventative Services Quick  Reference  Risk Factors Identified During Encounter:    Immunizations Discussed/Encouraged: Shingrix  Inactivity/Sedentary: Patient was advised to exercise at least 150 minutes a week per CDC recommendations.    The above risks/problems have been discussed with the patient.  Pertinent information has been shared with the patient in the After Visit Summary.    Diagnoses and all orders for this visit:    1. Medicare annual wellness visit, subsequent (Primary)        Follow Up:   Next Medicare Wellness visit to be scheduled in 1 year.      An After Visit Summary and PPPS were made available to the patient.

## 2023-11-28 ENCOUNTER — LAB (OUTPATIENT)
Dept: LAB | Facility: HOSPITAL | Age: 68
End: 2023-11-28
Payer: MEDICARE

## 2023-11-28 DIAGNOSIS — R97.20 ELEVATED PROSTATE SPECIFIC ANTIGEN (PSA): ICD-10-CM

## 2023-11-28 PROCEDURE — 84153 ASSAY OF PSA TOTAL: CPT

## 2023-11-28 PROCEDURE — 36415 COLL VENOUS BLD VENIPUNCTURE: CPT

## 2023-11-28 PROCEDURE — 84154 ASSAY OF PSA FREE: CPT

## 2023-11-30 LAB
PSA FREE MFR SERPL: 21.4 %
PSA FREE SERPL-MCNC: 1.35 NG/ML
PSA SERPL-MCNC: 6.3 NG/ML (ref 0–4)

## 2023-12-05 ENCOUNTER — OFFICE VISIT (OUTPATIENT)
Dept: UROLOGY | Facility: CLINIC | Age: 68
End: 2023-12-05
Payer: MEDICARE

## 2023-12-05 VITALS — HEART RATE: 69 BPM | OXYGEN SATURATION: 98 % | WEIGHT: 240 LBS | BODY MASS INDEX: 31.81 KG/M2 | HEIGHT: 73 IN

## 2023-12-05 DIAGNOSIS — R97.20 ELEVATED PROSTATE SPECIFIC ANTIGEN (PSA): ICD-10-CM

## 2023-12-05 DIAGNOSIS — R39.9 LOWER URINARY TRACT SYMPTOMS (LUTS): Primary | ICD-10-CM

## 2023-12-05 PROCEDURE — 1159F MED LIST DOCD IN RCRD: CPT | Performed by: UROLOGY

## 2023-12-05 PROCEDURE — 99214 OFFICE O/P EST MOD 30 MIN: CPT | Performed by: UROLOGY

## 2023-12-05 PROCEDURE — 1160F RVW MEDS BY RX/DR IN RCRD: CPT | Performed by: UROLOGY

## 2023-12-05 PROCEDURE — 51798 US URINE CAPACITY MEASURE: CPT | Performed by: UROLOGY

## 2023-12-05 NOTE — PROGRESS NOTES
Follow Up Office Visit      Patient Name: Angel Avelar  : 1955   MRN: [id number]     Chief Complaint:    Chief Complaint   Patient presents with    6m Greenlight       History of Present Illness: Angel Avelar is a 68 y.o. male who presents today for follow-up for evaluation of PSA.  Last PSA at 10 and 2023, this was obtained shortly after greenlight PVP for lower urinary tract symptoms.  He presents today for 6-month follow-up with repeat PSA which is down trended to 6.3.  He continues to do well from a urinary symptom standpoint.  Denies dysuria or hematuria.  Current IPSS 7.    Subjective      Review of System: Review of Systems   Genitourinary:  Negative for decreased urine volume, difficulty urinating, dysuria, enuresis, flank pain, frequency, hematuria and urgency.      I have reviewed the ROS documented by my clinical staff, updated as appropriate and I agree. Jaime Harley MD    I have reviewed and the following portions of the patient's history were updated as appropriate: past family history, past medical history, past social history, past surgical history and problem list.    Medications:     Current Outpatient Medications:     Cetirizine HCl (Allergy Relief) 10 MG capsule, Take 10 mg by mouth Daily., Disp: , Rfl:     cholecalciferol (VITAMIN D3) 1.25 MG (91249 UT) capsule, TAKE 1 CAPSULE BY MOUTH EVERY DAY THEN START THE 5000 UNITS DAILY, Disp: , Rfl:     fluticasone (FLONASE) 50 MCG/ACT nasal spray, 2 sprays into the nostril(s) as directed by provider Daily., Disp: , Rfl:     multivitamin with minerals tablet tablet, Take 1 tablet by mouth Daily., Disp: , Rfl:     tadalafil (Cialis) 20 MG tablet, Take 1 tablet by mouth Daily As Needed for Erectile Dysfunction., Disp: 30 tablet, Rfl: 5    Allergies:   No Known Allergies    IPSS Questionnaire (AUA-7):  Over the past month…    1)  Incomplete Emptying  How often have you had a sensation of not emptying your bladder?  1 - Less than 1 time in  "5   2)  Frequency  How often have you had to urinate less than every two hours? 0 - Not at all   3)  Intermittency  How often have you found you stopped and started again several times when you urinated?  0 - Not at all   4) Urgency  How often have you found it difficult to postpone urination?  1 - Less than 1 time in 5   5) Weak Stream  How often have you had a weak urinary stream?  4 - More than half the time   6) Straining  How often have you had to push or strain to begin urination?  0 - Not at all   7) Nocturia  How many times did you typically get up at night to urinate?  1 - 1 time   Total Score:  7       Quality of life due to urinary symptoms:  If you were to spend the rest of your life with your urinary condition the way it is now, how would you feel about that? 3-Mixed   Urine Leakage (Incontinence) 0-No Leakage       Post void residual bladder scan:   020mL     Objective     Physical Exam:   Vital Signs:   Vitals:    12/05/23 1416   Pulse: 69   SpO2: 98%   Weight: 109 kg (240 lb)   Height: 185.4 cm (72.99\")   PainSc: 0-No pain     Body mass index is 31.67 kg/m².     Physical Exam  Vitals and nursing note reviewed.   Constitutional:       Appearance: Normal appearance.   HENT:      Head: Normocephalic and atraumatic.   Cardiovascular:      Comments: Well perfused  Pulmonary:      Effort: Pulmonary effort is normal.   Abdominal:      General: Abdomen is flat.      Palpations: Abdomen is soft.   Musculoskeletal:         General: Normal range of motion.   Skin:     General: Skin is warm and dry.   Neurological:      General: No focal deficit present.      Mental Status: He is alert and oriented to person, place, and time. Mental status is at baseline.   Psychiatric:         Mood and Affect: Mood normal.         Behavior: Behavior normal.         Thought Content: Thought content normal.         Judgment: Judgment normal.           Labs:   Brief Urine Lab Results  (Last result in the past 365 days)        " Color   Clarity   Blood   Leuk Est   Nitrite   Protein   CREAT   Urine HCG        05/30/23 1543 Yellow   Clear   Negative   Trace   Negative   Negative                        Lab Results   Component Value Date    GLUCOSE 102 (H) 03/14/2023    CALCIUM 9.5 03/14/2023     03/14/2023    K 4.4 03/14/2023    CO2 30.0 (H) 03/14/2023     03/14/2023    BUN 17 03/14/2023    CREATININE 0.95 03/14/2023    EGFRIFNONA 46 (L) 08/09/2021    BCR 17.9 03/14/2023    ANIONGAP 7.0 03/14/2023       Lab Results   Component Value Date    WBC 5.47 03/14/2023    HGB 13.1 03/14/2023    HCT 42.2 03/14/2023    MCV 85.1 03/14/2023     03/14/2023       Images:   No Images in the past 120 days found..    Measures:   Tobacco:   Angel PATEL Valentino  reports that he has never smoked. He has never been exposed to tobacco smoke. He has never used smokeless tobacco.. I have educated him on the risk of diseases from using tobacco products.     Assessment / Plan      Assessment/Plan:   68 y.o. male is seen today for follow up for continued evaluation of lower urinary tract symptoms and elevated PSA.  Most recent PSA 6.3, downtrending from prior value shortly after greenlight PVP for lower urinary tract symptoms.  He continues to do well in the postoperative setting.  IPSS today is 7.  We have discussed the continued indication to trend current PSA.  If continued increasing trend and kinetic we will further consider workup with multiparametric MRI.  Time PSA is downtrending, would expect some slight elevation in postoperative setting.  He is understanding.  He will follow-up in 6 months with repeat PSA.    Diagnoses and all orders for this visit:    1. Lower urinary tract symptoms (LUTS) (Primary)    2. Elevated prostate specific antigen (PSA)  -     PSA Total+% Free (Serial); Future         Follow Up:   Return in about 6 months (around 6/5/2024) for Recheck.     I spent approximately 30 minutes providing clinical care for this patient;  including review of patient's chart and provider documentation, face to face time spent with patient in examination room (obtaining history, performing physical exam, discussing diagnosis and management options), placing orders, and completing patient documentation.     Jaime Harley MD  [facility]

## 2024-02-05 ENCOUNTER — HOSPITAL ENCOUNTER (OUTPATIENT)
Dept: CARDIOLOGY | Facility: HOSPITAL | Age: 69
Discharge: HOME | End: 2024-02-05
Payer: MEDICARE

## 2024-02-05 ENCOUNTER — LAB (OUTPATIENT)
Dept: LAB | Facility: HOSPITAL | Age: 69
End: 2024-02-05
Payer: MEDICARE

## 2024-02-05 ENCOUNTER — TRANSCRIBE ORDERS (OUTPATIENT)
Dept: CARDIOLOGY | Facility: HOSPITAL | Age: 69
End: 2024-02-05
Payer: MEDICARE

## 2024-02-05 ENCOUNTER — TRANSCRIBE ORDERS (OUTPATIENT)
Dept: LAB | Facility: HOSPITAL | Age: 69
End: 2024-02-05
Payer: MEDICARE

## 2024-02-05 DIAGNOSIS — Z01.812 PRE-OPERATIVE LABORATORY EXAMINATION: Primary | ICD-10-CM

## 2024-02-05 DIAGNOSIS — Z01.812 PRE-OPERATIVE LABORATORY EXAMINATION: ICD-10-CM

## 2024-02-05 LAB
ALBUMIN SERPL-MCNC: 4.3 G/DL (ref 3.5–5.2)
ALBUMIN/GLOB SERPL: 1.6 G/DL
ALP SERPL-CCNC: 56 U/L (ref 39–117)
ALT SERPL W P-5'-P-CCNC: 30 U/L (ref 1–41)
ANION GAP SERPL CALCULATED.3IONS-SCNC: 9.7 MMOL/L (ref 5–15)
AST SERPL-CCNC: 23 U/L (ref 1–40)
BILIRUB SERPL-MCNC: 0.8 MG/DL (ref 0–1.2)
BUN SERPL-MCNC: 17 MG/DL (ref 8–23)
BUN/CREAT SERPL: 10.7 (ref 7–25)
CALCIUM SPEC-SCNC: 9.8 MG/DL (ref 8.6–10.5)
CHLORIDE SERPL-SCNC: 105 MMOL/L (ref 98–107)
CO2 SERPL-SCNC: 27.3 MMOL/L (ref 22–29)
CREAT SERPL-MCNC: 1.59 MG/DL (ref 0.76–1.27)
DEPRECATED RDW RBC AUTO: 46 FL (ref 37–54)
EGFRCR SERPLBLD CKD-EPI 2021: 47 ML/MIN/1.73
ERYTHROCYTE [DISTWIDTH] IN BLOOD BY AUTOMATED COUNT: 14.3 % (ref 12.3–15.4)
GLOBULIN UR ELPH-MCNC: 2.7 GM/DL
GLUCOSE SERPL-MCNC: 97 MG/DL (ref 65–99)
HCT VFR BLD AUTO: 44.8 % (ref 37.5–51)
HGB BLD-MCNC: 14.9 G/DL (ref 13–17.7)
MCH RBC QN AUTO: 29.6 PG (ref 26.6–33)
MCHC RBC AUTO-ENTMCNC: 33.3 G/DL (ref 31.5–35.7)
MCV RBC AUTO: 89.1 FL (ref 79–97)
PLATELET # BLD AUTO: 211 10*3/MM3 (ref 140–450)
PMV BLD AUTO: 10.2 FL (ref 6–12)
POTASSIUM SERPL-SCNC: 4.4 MMOL/L (ref 3.5–5.2)
PROT SERPL-MCNC: 7 G/DL (ref 6–8.5)
RBC # BLD AUTO: 5.03 10*6/MM3 (ref 4.14–5.8)
SODIUM SERPL-SCNC: 142 MMOL/L (ref 136–145)
WBC NRBC COR # BLD AUTO: 5.02 10*3/MM3 (ref 3.4–10.8)

## 2024-02-05 PROCEDURE — 93005 ELECTROCARDIOGRAM TRACING: CPT | Performed by: OTOLARYNGOLOGY

## 2024-02-05 PROCEDURE — 80053 COMPREHEN METABOLIC PANEL: CPT

## 2024-02-05 PROCEDURE — 36415 COLL VENOUS BLD VENIPUNCTURE: CPT

## 2024-02-05 PROCEDURE — 85027 COMPLETE CBC AUTOMATED: CPT

## 2024-02-07 LAB
QT INTERVAL: 428 MS
QTC INTERVAL: 420 MS

## (undated) DEVICE — BLANKT WARM UPPR/BDY ARM/OUT 57X196CM

## (undated) DEVICE — DRAINBAG,ANTI-REFLUX TOWER,L/F,2000ML,LL: Brand: MEDLINE

## (undated) DEVICE — GLV SURG BIOGEL LTX PF 7 1/2

## (undated) DEVICE — ST PRIM GRVTY NDLESS 3 INJ PORT 105IN

## (undated) DEVICE — CATH FOL BARDEX HEMATURIA 3WY 22F 30CC

## (undated) DEVICE — PK CYSTO-TUR BASIC 10